# Patient Record
Sex: MALE | Race: WHITE | NOT HISPANIC OR LATINO | Employment: FULL TIME | URBAN - METROPOLITAN AREA
[De-identification: names, ages, dates, MRNs, and addresses within clinical notes are randomized per-mention and may not be internally consistent; named-entity substitution may affect disease eponyms.]

---

## 2017-02-16 ENCOUNTER — OFFICE VISIT (OUTPATIENT)
Dept: FAMILY MEDICINE | Facility: CLINIC | Age: 37
End: 2017-02-16
Payer: COMMERCIAL

## 2017-02-16 ENCOUNTER — DOCUMENTATION ONLY (OUTPATIENT)
Dept: FAMILY MEDICINE | Facility: CLINIC | Age: 37
End: 2017-02-16

## 2017-02-16 VITALS
OXYGEN SATURATION: 99 % | TEMPERATURE: 98 F | HEART RATE: 71 BPM | SYSTOLIC BLOOD PRESSURE: 121 MMHG | BODY MASS INDEX: 31.24 KG/M2 | DIASTOLIC BLOOD PRESSURE: 77 MMHG | HEIGHT: 73 IN | WEIGHT: 235.69 LBS | RESPIRATION RATE: 16 BRPM

## 2017-02-16 DIAGNOSIS — B35.4 TINEA CORPORIS: Primary | ICD-10-CM

## 2017-02-16 PROCEDURE — 99202 OFFICE O/P NEW SF 15 MIN: CPT | Mod: S$GLB,,, | Performed by: INTERNAL MEDICINE

## 2017-02-16 RX ORDER — FLUCONAZOLE 100 MG/1
100 TABLET ORAL DAILY
Qty: 7 TABLET | Refills: 4 | Status: SHIPPED | OUTPATIENT
Start: 2017-02-16 | End: 2017-03-18

## 2017-02-16 NOTE — PROGRESS NOTES
Health Maintenance Due   Topic Date Due    Lipid Panel  1980    TETANUS VACCINE  05/05/1999    Influenza Vaccine  08/01/2016

## 2017-02-16 NOTE — PATIENT INSTRUCTIONS
Lamisil Cream twice a day for one month of the rash recurs.    Cold Bond powder or Butt paste in your armpits, lower abdomen and groin will help prevent the infection.

## 2017-02-16 NOTE — MR AVS SNAPSHOT
[facility]  [id number] [address] [address]  Phone: [phone]  Fax: [phone]                  Amadou Mcneil   2017 8:40 AM   Office Visit    Description:  Male : 1980   Provider:  Leoncio Barone MD   Department:  [facility]           Reason for Visit     Rash           Diagnoses this Visit        Comments    Tinea corporis    -  Primary            To Do List           Goals (5 Years of Data)     None      Follow-Up and Disposition     Return in about 1 year (around 2018) for if you are not better return in one month.    Follow-up and Disposition History       These Medications        Disp Refills Start End    fluconazole (DIFLUCAN) 100 MG tablet 7 tablet 4 2017 3/18/2017    Take 1 tablet (100 mg total) by mouth once daily. - Oral    Pharmacy: [address] Ph #: [phone]         [facility] On Call     [facility] On Call Nurse Care Line -  Assistance  Registered nurses in the [facility] On Call Center provide clinical advisement, health education, appointment booking, and other advisory services.  Call for this free service at [phone].             Medications           Message regarding Medications     Verify the changes and/or additions to your medication regime listed below are the same as discussed with your clinician today.  If any of these changes or additions are incorrect, please notify your healthcare provider.        START taking these NEW medications        Refills    fluconazole (DIFLUCAN) 100 MG tablet 4    Sig: Take 1 tablet (100 mg total) by mouth once daily.    Class: Normal    Route: Oral           Verify that the below list of medications is an accurate representation of the medications you are currently taking.  If none reported, the list may be blank. If incorrect, please contact your healthcare provider. Carry this list with you in case of  "emergency.           Current Medications     fluconazole (DIFLUCAN) 100 MG tablet Take 1 tablet (100 mg total) by mouth once daily.           Clinical Reference Information           Your Vitals Were     BP Pulse Temp Resp Height Weight    121/77 (BP Location: Left arm, Patient Position: Sitting, BP Method: Automatic) 71 98 °F (36.7 °C) (Oral) 16 6' 1" (1.854 m) 106.9 kg (235 lb 10.8 oz)    SpO2 BMI             99% 31.09 kg/m2         Blood Pressure          Most Recent Value    BP  121/77      Allergies as of 2/16/2017     No Known Allergies      Immunizations Administered on Date of Encounter - 2/16/2017     None      Instructions    Lamisil Cream twice a day for one month of the rash recurs.    Cold Bond powder or Butt paste in your armpits, lower abdomen and groin will help prevent the infection.       Language Assistance Services     ATTENTION: Language assistance services are available, free of charge. Please call [phone].      ATENCIÓN: Si habla navid, tiene a winters disposición servicios gratuitos de asistencia lingüística. Llame al [phone].     JANET Ý: N?u b?n nói Ti?ng Vi?t, có các d?ch v? h? tr? ngôn ng? mi?n phí dành cho b?n. G?i s? [phone].         [facility] complies with applicable Federal civil rights laws and does not discriminate on the basis of race, color, national origin, age, disability, or sex.        "

## 2017-02-16 NOTE — PROGRESS NOTES
Subjective:       Patient ID: Amadou Mcneil is a 36 y.o. male.    Chief Complaint: Rash (sides and abdomen)    HPI       CHIEF COMPLAINT: Rash  HPI: Prescription steroids helped but until stopped in the comes roaring back, does not of the rationale    ONSET/TIMING: Onset      8 months     ago. Sudden: no.. Work related: no. Similar_problems_in_the_past: no.    DURATION:  Continuous..    QUALITY/COURSE:   Resolved.     LOCATION:     .  Armpits, inside of upper arms , lower abdomen    INTENSITY/SEVERITY:  Severity is #  0    (10 point scale).    CONTEXT/WHEN: .--Similar problems: no . .  Past treatments: none  . Exposure_to_others_with_similar_symptoms: no . . Exposure_to_poison _ivy: no. .   New exposures (soaps, lotions, laundry detergents, foods, medications, plants, insects or animals).    SYMPTOMS/RELATED: .--Possible medication side effect:    The following symptoms are positive if BOLD, negative otherwise.     REVIEW OF SYMPTOMS:  Itching.  Pain. Sharp_pain. Dull_pain. Burning_pain.  Erythema-Skin. Hypopigmentation.  hyperpigmentation . Inflammation. Herald_Patch.. fixed . evanescent.  Blisters. Purulence. Fever. Fatigue. Tick_Bites.               Review of Systems   Constitutional: Negative for fatigue, fever and unexpected weight change.   HENT: Negative for dental problem, hearing loss, nosebleeds, rhinorrhea, tinnitus, trouble swallowing and voice change.    Eyes: Negative for itching and visual disturbance.   Respiratory: Negative for cough, shortness of breath and wheezing.    Cardiovascular: Negative for chest pain and palpitations.   Gastrointestinal: Negative for abdominal pain, blood in stool, constipation, diarrhea, nausea and vomiting.   Endocrine: Negative for cold intolerance, heat intolerance, polydipsia and polyphagia.   Genitourinary: Negative for difficulty urinating and dysuria.   Musculoskeletal: Negative for arthralgias.   Allergic/Immunologic: Negative for environmental allergies  "and immunocompromised state.   Neurological: Negative for dizziness, seizures, weakness, numbness and headaches.   Hematological: Does not bruise/bleed easily.   Psychiatric/Behavioral: Negative for agitation, dysphoric mood, sleep disturbance and suicidal ideas. The patient is not nervous/anxious.        Objective:      Vitals:    02/16/17 0844   BP: 121/77   Pulse: 71   Resp: 16   Temp: 98 °F (36.7 °C)   TempSrc: Oral   SpO2: 99%   Weight: 106.9 kg (235 lb 10.8 oz)   Height: 6' 1" (1.854 m)   PainSc: 0-No pain     Physical Exam   Constitutional: He is oriented to person, place, and time. He appears well-developed and well-nourished.   HENT:   Head: Normocephalic and atraumatic.   Right Ear: External ear normal.   Left Ear: External ear normal.   Nose: Nose normal.   Mouth/Throat: Oropharynx is clear and moist.   Eyes: Conjunctivae and EOM are normal. Pupils are equal, round, and reactive to light. No scleral icterus.   Neck: Normal range of motion. Neck supple. No thyromegaly present.   Cardiovascular: Normal rate, regular rhythm, normal heart sounds and intact distal pulses.  Exam reveals no friction rub.    No murmur heard.  Pulmonary/Chest: Effort normal and breath sounds normal. No respiratory distress. He has no wheezes. He has no rales. He exhibits no tenderness.   Abdominal: Soft. Bowel sounds are normal. He exhibits no distension. There is no tenderness.   Musculoskeletal: Normal range of motion. He exhibits no edema or deformity.   Lymphadenopathy:     He has no cervical adenopathy.   Neurological: He is oriented to person, place, and time. He has normal reflexes. He displays normal reflexes. No cranial nerve deficit. He exhibits normal muscle tone. Coordination normal.   Skin: Skin is warm and dry. No rash noted.   Psychiatric: He has a normal mood and affect. His behavior is normal. Judgment and thought content normal.   Nursing note and vitals reviewed.        Assessment:       No diagnosis found.    "   Plan:     There are no diagnoses linked to this encounter.  No Follow-up on file.

## 2017-07-13 ENCOUNTER — DOCUMENTATION ONLY (OUTPATIENT)
Dept: FAMILY MEDICINE | Facility: CLINIC | Age: 37
End: 2017-07-13

## 2017-07-13 NOTE — PROGRESS NOTES
Health Maintenance Due   Topic Date Due    Lipid Panel  1981    TETANUS VACCINE  05/05/1999

## 2017-07-14 ENCOUNTER — DOCUMENTATION ONLY (OUTPATIENT)
Dept: FAMILY MEDICINE | Facility: CLINIC | Age: 37
End: 2017-07-14

## 2017-07-14 ENCOUNTER — TELEPHONE (OUTPATIENT)
Dept: FAMILY MEDICINE | Facility: CLINIC | Age: 37
End: 2017-07-14

## 2017-07-14 ENCOUNTER — OFFICE VISIT (OUTPATIENT)
Dept: FAMILY MEDICINE | Facility: CLINIC | Age: 37
End: 2017-07-14
Payer: COMMERCIAL

## 2017-07-14 VITALS
TEMPERATURE: 99 F | WEIGHT: 239.88 LBS | BODY MASS INDEX: 31.79 KG/M2 | DIASTOLIC BLOOD PRESSURE: 81 MMHG | HEART RATE: 78 BPM | SYSTOLIC BLOOD PRESSURE: 133 MMHG | HEIGHT: 73 IN

## 2017-07-14 VITALS
DIASTOLIC BLOOD PRESSURE: 81 MMHG | TEMPERATURE: 99 F | SYSTOLIC BLOOD PRESSURE: 133 MMHG | HEIGHT: 73 IN | OXYGEN SATURATION: 99 % | WEIGHT: 239.88 LBS | HEART RATE: 78 BPM | RESPIRATION RATE: 16 BRPM | BODY MASS INDEX: 31.79 KG/M2

## 2017-07-14 DIAGNOSIS — L03.116 CELLULITIS OF LEFT KNEE: Primary | ICD-10-CM

## 2017-07-14 DIAGNOSIS — R50.81 FEVER IN OTHER DISEASES: Primary | ICD-10-CM

## 2017-07-14 DIAGNOSIS — L02.91 ABSCESS: ICD-10-CM

## 2017-07-14 DIAGNOSIS — L03.116 CELLULITIS OF LEFT LOWER EXTREMITY: ICD-10-CM

## 2017-07-14 PROCEDURE — 99999 PR PBB SHADOW E&M-EST. PATIENT-LVL III: CPT | Mod: PBBFAC,,, | Performed by: FAMILY MEDICINE

## 2017-07-14 PROCEDURE — 99024 POSTOP FOLLOW-UP VISIT: CPT | Mod: S$GLB,,, | Performed by: FAMILY MEDICINE

## 2017-07-14 PROCEDURE — 10060 I&D ABSCESS SIMPLE/SINGLE: CPT | Mod: S$GLB,,, | Performed by: INTERNAL MEDICINE

## 2017-07-14 PROCEDURE — 96372 THER/PROPH/DIAG INJ SC/IM: CPT | Mod: S$GLB,,, | Performed by: FAMILY MEDICINE

## 2017-07-14 PROCEDURE — 99212 OFFICE O/P EST SF 10 MIN: CPT | Mod: 25,S$GLB,, | Performed by: INTERNAL MEDICINE

## 2017-07-14 RX ORDER — MUPIROCIN 20 MG/G
OINTMENT TOPICAL 3 TIMES DAILY
Qty: 15 G | Refills: 1 | Status: SHIPPED | OUTPATIENT
Start: 2017-07-14 | End: 2017-07-24

## 2017-07-14 RX ORDER — DOXYCYCLINE 100 MG/1
100 CAPSULE ORAL EVERY 12 HOURS
Qty: 20 CAPSULE | Refills: 0 | Status: SHIPPED | OUTPATIENT
Start: 2017-07-14 | End: 2017-09-14 | Stop reason: ALTCHOICE

## 2017-07-14 RX ORDER — CEFTRIAXONE 1 G/1
1 INJECTION, POWDER, FOR SOLUTION INTRAMUSCULAR; INTRAVENOUS
Status: COMPLETED | OUTPATIENT
Start: 2017-07-14 | End: 2017-07-14

## 2017-07-14 RX ADMIN — CEFTRIAXONE 1 G: 1 INJECTION, POWDER, FOR SOLUTION INTRAMUSCULAR; INTRAVENOUS at 02:07

## 2017-07-14 NOTE — TELEPHONE ENCOUNTER
Patient saw Dr Barone today at 8:40 then made an appt to see Dr Levin today at 10:40.  Patient states he wanted to make sure Dr Barone did everything right.  He also stated he wanted to get a steroid shot.  Informed patient his appt will be canceled with Dr Levin.  Patient hung up the phone.

## 2017-07-14 NOTE — PROCEDURES
"Incision & Drainage  Date/Time: 7/14/2017 9:20 AM  Performed by: MONTY JOHN  Authorized by: MONTY JOHN     Time out: Immediately prior to procedure a "time out" was called to verify the correct patient, procedure, equipment, support staff and site/side marked as required.    Consent Done?:  Yes (Written)    Type:  Abscess  Body area:  Lower extremity  Location details:  Left leg  Anesthesia:  Local infiltration  Local anesthetic:  Lidocaine 1% without epinephrinelidocaine 1% with epinephrine  Anesthetic total (ml):  3  Scalpel size:  11  Incision type:  Single straight  Complexity:  Simple  Drainage:  Serosanguinous  Drainage amount:  Moderate  Wound treatment:  Incision, drainage, expression of material, wound packed and drain placed  Packing material:  1/4 in gauze  Patient tolerance:  Patient tolerated the procedure well with no immediate complications      "

## 2017-07-14 NOTE — PROGRESS NOTES
2 patient identifiers used ( name &  ).  Administered Rocephin 1g vaccine IM RUOQG.  Patient tolerated well, no bleeding at insertion site noted.  Pain scale 0/10.  Aseptic technique maintained.  Advised patient to remain in clinic for 15 minutes to monitor for  Reaction.  No AR noted

## 2017-07-14 NOTE — PROGRESS NOTES
Pre-Visit Chart Review  For Appointment Scheduled on 07/14/2017    Health Maintenance Due   Topic Date Due    Lipid Panel  1980    TETANUS VACCINE  05/05/1999

## 2017-07-14 NOTE — PATIENT INSTRUCTIONS
Elevate foot (above heart level) frequently to resolve the swelling.    Twice a day - use a warm-hot compress then do wound care with water/betadine and soap.      On Monday either remove the packing if it looks a lot better or get a re-check at a clinic at [place] or an Urgent Care clinic.

## 2017-07-14 NOTE — PROGRESS NOTES
"Subjective:       Patient ID: Amadou Mcneil is a 37 y.o. male.    Chief Complaint: sores on knee and Fever    HPI         CHIEF COMPLAINT: Abscess  HPI:     ONSET/TIMING: Onset     1 week      ago. Sudden: no.. Work related: no. Similar_problems_in_the_past: no.    DURATION:  Continuous..    QUALITY/COURSE:   Improving for 1 day once drainage started.     LOCATION:    Left knee .     INTENSITY/SEVERITY:  Severity is #   6   (10 point scale).    CONTEXT/WHEN: .--Similar problems: no . .  Past treatments: none  . Exposure_to_others_with_similar_symptoms: no . . Exposure_to_poison _ivy: no. .   New exposures (soaps, lotions, laundry detergents, foods, medications, plants, insects or animals).    SYMPTOMS/RELATED: .--Possible medication side effect:    The following symptoms are positive if BOLD, negative otherwise.     REVIEW OF SYMPTOMS:  Itching.  Pain. Sharp_pain. Dull_pain. Burning_pain.  Erythema-Skin. Hypopigmentation.  hyperpigmentation . Inflammation. Herald_Patch.. fixed . evanescent.  Blisters. Purulence. Fever. Fatigue. Tick_Bites.               Review of Systems    Objective:      Vitals:    07/14/17 0831   BP: 133/81   Pulse: 78   Resp: 16   Temp: 98.6 °F (37 °C)   TempSrc: Oral   SpO2: 99%   Weight: 108.8 kg (239 lb 13.8 oz)   Height: 6' 1" (1.854 m)   PainSc:   5   PainLoc: Knee     Physical Exam   Constitutional: He appears well-developed and well-nourished.   Eyes: Pupils are equal, round, and reactive to light.   Cardiovascular: Normal rate, regular rhythm and normal heart sounds.    Pulmonary/Chest: Effort normal and breath sounds normal.   Abdominal: Soft. There is no tenderness.   Neurological: He is alert.   Skin:   6 cm abscess over the anterior lower knee.  The abscess at the top is at the patella.  It's draining in the center.  There is a cellulitis of 9 x 10 cm.  There is also a 2 cm abscess over the patella.  This is not draining.   Psychiatric: He has a normal mood and affect. His " behavior is normal. Thought content normal.   Nursing note and vitals reviewed.        Assessment:       1. Fever in other diseases    2. Abscess    3. Cellulitis of left lower extremity          Plan:     Fever in other diseases    Abscess  -     INCISION AND DRAINAGE    Cellulitis of left lower extremity  -     doxycycline (VIBRAMYCIN) 100 MG Cap; Take 1 capsule (100 mg total) by mouth every 12 (twelve) hours.  Dispense: 20 capsule; Refill: 0      No Follow-up on file.

## 2017-07-14 NOTE — PATIENT INSTRUCTIONS
Weight Management: Getting Started  Healthy bodies come in all shapes and sizes. Not all bodies are made to be thin. For some people, a healthy weight is higher than the average weight listed on weight charts. Your healthcare provider can help you decide on a healthy weight for you.    Reasons to lose weight  Losing weight can help with some health problems, such as high blood pressure, heart disease, diabetes, sleep apnea, and arthritis. You may also feel more energy.  Set your long-term goal  Your goal doesn't even have to be a specific weight. You may decide on a fitness goal (such as being able to walk 10 miles a week), or a health goal (such as lowering your blood pressure). Choose a goal that is measurable and reasonable, so you know when you've reached it. A goal of reaching a BMI of less than 25 is not always reasonable (or possible).   Make an action plan  Habits dont change overnight. Setting your goals too high can leave you feeling discouraged if you cant reach them. Be realistic. Choose one or two small changes you can make now. Set an action plan for how you are going to make these changes. When you can stick to this plan, keep making a few more small changes. Taking small steps will help you stay on the path to success.  Track your progress  Write down your goals. Then, keep a daily record of your progress. Write down what you eat and how active you are. This record lets you look back on how much youve done. It may also help when youre feeling frustrated. Reward yourself for success. Even if you dont reach every goal, give yourself credit for what you do get done.  Get support  Encouragement from others can help make losing weight easier. Ask your family members and friends for support. They may even want to join you. Also look to your healthcare provider, registered dietitian, and  for help. Your local hospital can give you more information about nutrition, exercise, and  weight loss.  Date Last Reviewed: 1/31/2016 © 2000-2016 [employer]. [address] [place] [address], [place] [address]. All rights reserved. This information is not intended as a substitute for professional medical care. Always follow your healthcare professional's instructions.        Walking for Fitness  Fitness walking has something for everyone, even people who are already fit. Walking is one of the safest ways to condition your body aerobically. It can boost energy, help you lose weight, and reduce stress.    Physical benefits  · Walking strengthens your heart and lungs, and tones your muscles.  · When walking, your feet land with less impact than in other sports. This reduces chances of muscle, bone, and joint injury.  · Regular walking improves your cholesterol levels and lowers your risk of heart disease. And it helps you control your blood sugar if you have diabetes.  · Walking is a weight-bearing activity, which helps maintain bone density. This can help prevent osteoporosis.  Personal rewards  · Taking walks can help you relax and manage stress. And fitness walking may make you feel better about yourself.  · Walking can help you sleep better at night and make you less likely to be depressed.  · Regular walking may help maintain your memory as you get older.  · Walking is a great way to spend extra time with friends and family members. Be sure to invite your dog along!  Q&A about fitness walking  Q: Will walking keep me fit?  A: Yes. Regular walking at the right pace gives you all the benefits of other aerobic activities, such as jogging and swimming.  Q: Will walking help me lose weight and keep it off?  A: Yes. Per mile, walking can burn as many calories as jogging. Your health care provider can help work walking into your weight-loss plan.  Q: Is walking safe for my health?  A: Yes. Walking is safe if you have high blood pressure, diabetes, heart disease, or other conditions. Talk to your health  care provider before you start.  Date Last Reviewed: 5/9/2015  © 9970-4900 [employer]. [address] [place] [address], [place] [address]. All rights reserved. This information is not intended as a substitute for professional medical care. Always follow your healthcare professional's instructions.

## 2017-07-14 NOTE — PROGRESS NOTES
"Subjective:       Patient ID: Amadou Mcneil is a 37 y.o. male.    Chief Complaint: Staph infection on knee    Pt and wife concerned if there is anything further that can be done treatment-wise for his infection as they are traveling tomorrow to [facility] where she is scheduled for surgery for a brain AVM next week.   He was seen and treated earlier today and had an I&D procedure done - pt concerned that he saw "only some bloody drainage".  I advised that occurs not uncommonly if there is not a eh abscess pocket involved but that the drainage sites and packing allow any pus to drain out.         Abscess   Chronicity:  NewProgression Since Onset: gradually worsening  Abscess location: left knee.  Associated Symptoms: a fever, chills  Characteristics: painful, redness and swelling      Review of Systems   Constitutional: Positive for chills and fever.   Respiratory: Negative for shortness of breath.    Cardiovascular: Negative for chest pain.   Gastrointestinal: Negative for abdominal pain and nausea.   Skin: Negative for rash.   Neurological: Negative for numbness.   All other systems reviewed and are negative.      Objective:      Physical Exam   Constitutional: He appears well-developed and well-nourished. No distress.   Musculoskeletal: He exhibits edema (1-2 plus to left lower leg below knee).   Skin: He is not diaphoretic.            Assessment:       1. Cellulitis of left knee        Plan:         Amadou was seen today for staph infection on knee.    Diagnoses and all orders for this visit:    Cellulitis of left knee  -     mupirocin (BACTROBAN) 2 % ointment; Apply topically 3 (three) times daily.    Other orders  -     cefTRIAXone injection 1 g; Inject 1 g into the muscle one time.      Patient Instructions   Elevate foot (above heart level) frequently to resolve the swelling.    Twice a day - use a warm-hot compress then do wound care with water/betadine and soap.      On Monday " either remove the packing if it looks a lot better or get a re-check at a clinic at [place] or an Urgent Care clinic.

## 2017-07-14 NOTE — PATIENT INSTRUCTIONS
Keep thick gauze over the draining areas.  Change them if they are saturated or least twice a day.  He careful to leave the gauze ribbons in the wound.  The best way to do this is to use Q-tips to hold them..    In 3 days you need to go to urgent care to have the gauze since in the wound pulled out and some Nu Gauze put in.  You do not have to be numbed up as this is a minor procedure just to keep the wound open.

## 2017-09-12 ENCOUNTER — DOCUMENTATION ONLY (OUTPATIENT)
Dept: FAMILY MEDICINE | Facility: CLINIC | Age: 37
End: 2017-09-12

## 2017-09-12 NOTE — PROGRESS NOTES
Pre-Visit Chart Review  For Appointment Scheduled on 9/14/2017     Health Maintenance Due   Topic Date Due    Lipid Panel  1980    TETANUS VACCINE  05/05/1999    Influenza Vaccine  08/01/2017

## 2017-09-14 ENCOUNTER — OFFICE VISIT (OUTPATIENT)
Dept: FAMILY MEDICINE | Facility: CLINIC | Age: 37
End: 2017-09-14
Payer: COMMERCIAL

## 2017-09-14 VITALS
DIASTOLIC BLOOD PRESSURE: 86 MMHG | TEMPERATURE: 99 F | WEIGHT: 244 LBS | SYSTOLIC BLOOD PRESSURE: 142 MMHG | HEART RATE: 67 BPM | HEIGHT: 73 IN | BODY MASS INDEX: 32.34 KG/M2

## 2017-09-14 DIAGNOSIS — R03.0 ELEVATED BLOOD PRESSURE READING IN OFFICE WITHOUT DIAGNOSIS OF HYPERTENSION: ICD-10-CM

## 2017-09-14 DIAGNOSIS — L02.419 ABSCESS OF LEG: Primary | ICD-10-CM

## 2017-09-14 PROCEDURE — 99213 OFFICE O/P EST LOW 20 MIN: CPT | Mod: S$GLB,,, | Performed by: NURSE PRACTITIONER

## 2017-09-14 PROCEDURE — 99999 PR PBB SHADOW E&M-EST. PATIENT-LVL III: CPT | Mod: PBBFAC,,, | Performed by: NURSE PRACTITIONER

## 2017-09-14 PROCEDURE — 3008F BODY MASS INDEX DOCD: CPT | Mod: S$GLB,,, | Performed by: NURSE PRACTITIONER

## 2017-09-14 RX ORDER — MUPIROCIN 20 MG/G
OINTMENT TOPICAL 2 TIMES DAILY
Qty: 44 G | Refills: 2 | Status: SHIPPED | OUTPATIENT
Start: 2017-09-14 | End: 2018-03-01 | Stop reason: SDUPTHER

## 2017-09-14 RX ORDER — SULFAMETHOXAZOLE AND TRIMETHOPRIM 800; 160 MG/1; MG/1
1 TABLET ORAL 2 TIMES DAILY
Qty: 40 TABLET | Refills: 0 | Status: SHIPPED | OUTPATIENT
Start: 2017-09-14 | End: 2017-10-04

## 2017-09-14 NOTE — PROGRESS NOTES
Subjective:       Patient ID: Amadou Mcneil is a 37 y.o. male.    Chief Complaint: Abscess    HPI   Chief Complaint  Chief Complaint   Patient presents with    Abscess       HPI  Amadou Mcneil is a 37 y.o. 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  Had some amoxicillin and started taking it and used some over the counter antibiotic ointment.  States had redness surrounding hard spot to thigh to large area of upper leg.  Has resolved mostly with just small area of redness remaining.  Has had abscess cultured in past that was positive for MRSA. Patient also has elevated blood pressure today without diagnosis of hypertension.  Has been eating high sodium diet. BMI 31.65, 5 pound weight gain since last visit in July 2017.  Established patient with last clinic appointment 7/14/2017.        PAST MEDICAL HISTORY:  History reviewed. No pertinent past medical history.    PAST SURGICAL HISTORY:  Past Surgical History:   Procedure Laterality Date    WISDOM TOOTH EXTRACTION         SOCIAL HISTORY:  Social History     Social History    Marital status:      Spouse name: N/A    Number of children: N/A    Years of education: N/A     Occupational History    [employer]      Social History Main Topics    Smoking status: Former Smoker    Smokeless tobacco: Never Used    Alcohol use Yes    Drug use: No    Sexual activity: Not on file     Other Topics Concern    Not on file     Social History Narrative    No narrative on file       FAMILY HISTORY:  Family History   Problem Relation Age of Onset    Cancer Father        ALLERGIES AND MEDICATIONS: updated and reviewed.  Review of patient's allergies indicates:  No Known Allergies  Current Outpatient Prescriptions   Medication Sig Dispense Refill    mupirocin (BACTROBAN) 2 % ointment Apply topically 2 (two) times  "daily. Affected area, nares, axilla, groin 44 g 2    sulfamethoxazole-trimethoprim 800-160mg (BACTRIM DS) 800-160 mg Tab Take 1 tablet by mouth 2 (two) times daily. 40 tablet 0     No current facility-administered medications for this visit.            Review of Systems   Constitutional: Negative for activity change, appetite change, fatigue, fever and unexpected weight change.   HENT: Negative for congestion, rhinorrhea and sinus pain.    Eyes: Negative for visual disturbance.   Respiratory: Negative for cough and shortness of breath.    Cardiovascular: Negative for chest pain, palpitations and leg swelling.   Gastrointestinal: Negative for abdominal pain, constipation, diarrhea, nausea and vomiting.   Genitourinary: Negative for difficulty urinating.   Neurological: Positive for numbness. Negative for dizziness and weakness.        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 The patient is not nervous/anxious.        Objective:       Vitals:    09/14/17 0915 09/14/17 0917   BP: (!) 135/92 (!) 142/86   BP Location: Right arm Left arm   Patient Position: Sitting Sitting   BP Method: Large (Automatic) Large (Manual)   Pulse: 67    Temp: 98.7 °F (37.1 °C)    TempSrc: Oral    Weight: 110.7 kg (244 lb)    Height: 6' 1" (1.854 m)      Physical Exam   Constitutional: He is oriented to person, place, and time. He appears well-developed. No distress.   Cardiovascular: Normal rate, regular rhythm, normal heart sounds and intact distal pulses.  Exam reveals no gallop and no friction rub.    No murmur heard.  Pulmonary/Chest: Effort normal and breath sounds normal. No respiratory distress. He has no wheezes. He has no rales.   Musculoskeletal: Normal range of motion.   Neurological: He is alert and oriented to person, place, and time.   Skin: Skin is warm and dry. Capillary refill takes less than 2 seconds. Rash noted. Rash is " pustular. He is not diaphoretic.        3 cm round firm abscess to left inner thigh, mildly warm, painful to palpation, no surrounding redness   Psychiatric: He has a normal mood and affect. His behavior is normal. Judgment and thought content normal.       Assessment:       1. Abscess of leg    2. Elevated blood pressure reading in office without diagnosis of hypertension    3. BMI 32.0-32.9,adult        Plan:   Amadou was seen today for abscess.    Diagnoses and all orders for this visit:    Abscess of leg        -  mupirocin (BACTROBAN) 2 % ointment; Apply topically 2 (two) times daily. Affected area, nares, axilla, groin  -     sulfamethoxazole-trimethoprim 800-160mg (BACTRIM DS) 800-160 mg Tab; Take 1 tablet by mouth 2 (two) times daily.  -   Educational handouts provided    Elevated blood pressure reading in office without diagnosis of hypertension  Comments:  Check blood pressure once per week, if over 140/over 90, will need meedication    BMI 32.0-32.9,adult  Discussed need for weight loss, portion controlled diet and aerobic exercise    Patient readiness: acceptance and barriers:none    During the course of the visit the patient was educated and counseled about the following:     Obesity:   General weight loss/lifestyle modification strategies discussed (elicit support from others; identify saboteurs; non-food rewards, etc).    Goals: Obesity: Reduce calorie intake and BMI    Did patient meet goals/outcomes: No    The following self management tools provided: declined    Patient Instructions (the written plan) was given to the patient/family.     Time spent with patient: 15 minutes

## 2017-09-14 NOTE — PATIENT INSTRUCTIONS
Methicillin-Resistant Staphylococcus aureus (MRSA)  What is MRSA?  Staphylococcus aureus bacteria or staph are common germs. They are normally found on the skin or in the nose of many people. Usually, the bacteria cause no problem. Occasionally, they can cause mild skin infections. Or severe infections of the skin, lungs, blood, or other organs or tissues may develop. Some staph infections can be easily treated with antibiotics. But one type of staph infection, methicillin-resistant staphylococcus aureus (MRSA) cannot. It is called methicillin-resistant because the antibiotic, methicillin, which used to be effective treatment, no longer works. MRSA is common in hospitals and nursing homes or long-term care facilities. It is also spreading among healthy children and adults outside the healthcare system. A person may be a carrier. Or he or she may have the infection.  · Colonization. When a person carries the MRSA bacteria but is healthy, it's called being colonized. This person can spread MRSA to others but has no infection.  · Infection. When a person gets sick because of the bacteria, it's called being infected with MRSA. This person can also spread MRSA to others. If not treated properly, MRSA infections can be very serious and even cause death.    What are the risk factors for MRSA?  Anyone can get MRSA, although there are factors that increase the risk. Some of these include:  · Recent or lengthy hospital stay  · Having a surgical wound or intravenous (IV) line  · Having a weakened immune system due to a medical condition or its treatment  · Living in a nursing home or long-term care facility  · Recent antibiotic therapy  · Diabetes  · Kidney dialysis  · HIV infection  · Injection drug use or sharing needles  · retirement or long-term time  · Living in any crowded facility, such as a dormitory  ·  service  · Sharing sports equipment, razors, or other sharp objects  How does MRSA spread?  · People who are  colonized with MRSA have MRSA in their noses or on their skin. Though they may not be sick themselves, they can spread the germs to others.  · In hospitals and long-term care facilities, MRSA can spread from patient to patient on the hands of healthcare workers. It can also spread on objects, such as cart or door handles and bedrails.  · Outside healthcare settings, MRSA usually spreads through skin-to-skin contact, shared towels or athletic equipment, or through close contact with an infected person.  What are the symptoms of MRSA infection?  MRSA skin infections start as small red bumps on the skin that look like pimples or spider bites. The small bumps usually get larger and become swollen, painful, warm to the touch, and filled with pus. Fever may be present. MRSA can also start in other ways. And it can spread deeper into the body where it can cause one or more of the following:  · Infections in bones, muscles, and other tissues  · Pneumonia, an infection in one or both lungs  · Infection of a surgical wound  · Infection in the bloodstream (bacteremia)  · Infection of the lining of the heart (endocarditis)  · Infection of the urinary tract (bladder and kidneys)  How is MRSA diagnosed?  A sample of blood, urine, or infected tissue may be taken to diagnose a MRSA infection. A swab of the inside of the nose is taken to diagnose colonization. The sample is then sent to a laboratory and tested for MRSA. if the infection involves bone, joint, or other organs, a blood test may be done. Imaging studies, such as an X-ray or CT scan, may also be needed.  How is MRSA treated?  MRSA infections are usually treated with antibiotics. It may be given by mouth in pill form or into a vein (intravenous or IV). If a skin abscess is present, it may be drained.   Patients who test positive for MRSA colonization may undergo a process called decolonization. A topical antibiotic is applied inside the nose or in the nostrils to kill the  bacteria. A special soap may be used to cleanse the skin.  Can MRSA be prevented?  Hospitals and nursing homes help prevent MRSA by doing the following:  · Handwashing. This is the single most important way to prevent the spread of germs. Healthcare workers should wash their hands with soap and water or an alcohol-based hand  before and after treating each patient. They also should clean their hands after touching any surface that may be contaminated.  · Protective clothing. Healthcare workers and visitors may wear gloves and a gown when entering the room of a patient with MRSA. They remove these items before leaving.  · Private rooms. Patients with MRSA infections are placed in private rooms or in a room with others who have the same infection.  · Personal care items. Patients with MRSA may have their own patient care items, such as thermometers and stethoscopes.  · Monitoring. Hospitals monitor the spread of MRSA and educate all staff on the best ways to prevent it.  Patients can help prevent MRSA by doing the following:  · Ask all hospital staff to wash their hands before touching you. Dont be afraid to speak up!  · Wash your own hands frequently with soap and water. Or use an alcohol-based hand gel.  · Ask that stethoscopes and other instruments be wiped with alcohol before they are used on you.  · Be sure youre tested for MRSA if you have a skin infection.  If you are taking care of someone with MRSA:  · Wash your hands well with soap and water before and after any contact with the person.  · Wear gloves when changing a bandage or touching an infected wound. Discard gloves after each use. Then wash your hands well.  · Wash the patient's bed linens, towels, and clothing in hot water with detergent or liquid bleach.  Everyone can help prevent MRSA by doing the following:  · Wash your hands often with warm water and soap.  ¨ Rub your hands together.  ¨ Clean the whole hand, under your nails, between your  fingers, and up the wrists.  ¨ Wash for at least 15 to 20 seconds.  ¨ Rinse, letting the water run down your fingers, not up your wrists.  ¨ Dry your hands well. Use a paper towel or turn off the faucet and open the door.  · If soap and water aren't available, use an alcohol-based hand .  ¨ Squeeze about a tablespoon of  into the palm of one hand.  ¨ Rub your hands together briskly, cleaning the backs of your hands, the palms, between your fingers, and up the wrists.  ¨ Rub until the  is gone and your hands are completely dry.  · Keep cuts and scrapes clean and covered until they heal.  · Avoid contact with the wounds or bandages of others.  · Avoid sharing towels, razors, clothing, and athletic equipment.  Date Last Reviewed: 10/1/2016  © 8745-9475 [employer]. [address] [place] [address]. All rights reserved. This information is not intended as a substitute for professional medical care. Always follow your healthcare professional's instructions.        Staph Infection (MRSA)  Staph is the short name for the common bacteria called staphylococcus aureus. Staph bacteria are often present on the skin without causing an infection. If it gets under the skin an infection occurs. This causes redness, tenderness, swelling and sometimes fluid drainage.  MRSA stands for methicillin-resistant staph aureus. Unlike a common staph infection, MRSA bacteria are resistant to the usual antibiotics and harder to treat. Also, MRSA is more toxic than common staph bacteria. It can spread quickly throughout the body and cause a life-threatening illness.  MRSA is spread to others by direct physical contact with the bacteria. MRSA can also be transmitted from items contaminated by a person who has the bacteria, such as bandages, towels, bed sheets, or sports equipment. It is generally not spread through the air.  But you can acquire it if you come in direct contact with the fluid from  someone's cough or sneeze.  Once you have a MRSA skin infection, you are at risk of having it again in the future.  If MRSA infection is suspected, the healthcare provider may take a wound culture to confirm the diagnosis. If an abscess is present, it may be drained. One or more antibiotics that work against MRSA will likely be prescribed.  Home care  · Take any antibiotics prescribed exactly as directed. Do not stop taking them until they are gone or your healthcare provider tells you to stop, even if you feel better.  · If antibiotic ointment was prescribed, use it as directed.  · Wash your whole body (scalp to toes) daily for 5 days with prescription soap. Scrub fingernails with a brush for 1 minute twice a day with prescription soap.  · Keep wounds covered with clean, dry bandages. Change dressings as they become soiled. Wash your hands well each time you change the bandage or touch the wound.  · Remove any artificial nails and nail polish.  Treating household members  If you have been diagnosed with possible MRSA infection, those living with you are at higher risk of carrying the bacteria on their skin or in their nose, even if there is no sign of infection. Bacteria must be removed from the skin of all household members at the same time so it is not passed back and forth. Advise them to remove the bacteria as follows:  · Household member should wash with prescription soap as outlined above.  · If anyone in the household has a skin infection, it must be treated by a healthcare provider. Washing alone will not treat a MRSA infection.  · Clean counter tops and children's toys.  · Do not share personal items such as toothbrush and razors. It is okay to share glasses, plates, and utensils.  Preventing spread of infection  · Wash your hands frequently with plain soap and warm water. Be certain to clean under the fingernails, between the fingers, and the wrists. Dry hands with a single use towel (for example a paper  towel). If soap and water are not available, you can use an alcohol-based hand . Rub the  over the entire surface of the hands, fingers, and wrists until dry.  · Avoid sharing personal items such as towels, washcloths, razors, clothing, or uniforms. Wash soiled sheets, towels or clothes in hot water with laundry detergent. Use an automatic clothes dryer set on high to kill any remaining bacteria.  · If you use a gym, wipe down equipment with an alcohol-based  before and after each use.  Wipe the handgrips as well.  · If you participate in sports, shower with plain soap after every activity. Use a clean towel for each shower.  Follow-up care  Follow-up with your healthcare provider or as advised by our staff. If a wound culture was taken, call as directed for the results. You will be told about any changes to your treatment.  If you are diagnosed with MRSA, tell medical personnel in the future that you have been treated for this type of infection.  When to seek medical advice  Call your healthcare provider if any of the following occur:  · Increasing redness, swelling or pain  · Red streaks in the skin around the wound  · Weakness or dizziness  · New appearance of pus or drainage from the wound  · New fever over 100.4º F (38.0º C), or as directed by the healthcare provider  Date Last Reviewed: 9/25/2015  © 2462-0861 [employer]. [address] [place] [address]. All rights reserved. This information is not intended as a substitute for professional medical care. Always follow your healthcare professional's instructions.

## 2018-02-26 ENCOUNTER — TELEPHONE (OUTPATIENT)
Dept: FAMILY MEDICINE | Facility: CLINIC | Age: 38
End: 2018-02-26

## 2018-02-26 NOTE — TELEPHONE ENCOUNTER
Returned patient wife call, she is requesting to give patient advise about not smoking.       ----- Message from Molly Ga sent at 2/26/2018 12:52 PM CST -----  Contact: [email or web address]/[phone]  Patients wife states that she need to speak to you about some issues with the patient.  Please call Spencer at [phone].

## 2018-03-01 ENCOUNTER — OFFICE VISIT (OUTPATIENT)
Dept: FAMILY MEDICINE | Facility: CLINIC | Age: 38
End: 2018-03-01
Payer: COMMERCIAL

## 2018-03-01 VITALS
WEIGHT: 235 LBS | DIASTOLIC BLOOD PRESSURE: 82 MMHG | BODY MASS INDEX: 31.14 KG/M2 | HEIGHT: 73 IN | SYSTOLIC BLOOD PRESSURE: 126 MMHG | TEMPERATURE: 99 F | HEART RATE: 74 BPM

## 2018-03-01 DIAGNOSIS — A49.02 MRSA (METHICILLIN RESISTANT STAPHYLOCOCCUS AUREUS): ICD-10-CM

## 2018-03-01 DIAGNOSIS — Z72.0 TOBACCO USE: ICD-10-CM

## 2018-03-01 DIAGNOSIS — L02.214 GROIN ABSCESS: Primary | ICD-10-CM

## 2018-03-01 DIAGNOSIS — L03.314 CELLULITIS OF GROIN: ICD-10-CM

## 2018-03-01 PROCEDURE — 99213 OFFICE O/P EST LOW 20 MIN: CPT | Mod: S$GLB,,, | Performed by: NURSE PRACTITIONER

## 2018-03-01 PROCEDURE — 99999 PR PBB SHADOW E&M-EST. PATIENT-LVL III: CPT | Mod: PBBFAC,,, | Performed by: NURSE PRACTITIONER

## 2018-03-01 RX ORDER — SULFAMETHOXAZOLE AND TRIMETHOPRIM 800; 160 MG/1; MG/1
1 TABLET ORAL 2 TIMES DAILY
Qty: 40 TABLET | Refills: 0 | Status: SHIPPED | OUTPATIENT
Start: 2018-03-01 | End: 2018-04-26 | Stop reason: ALTCHOICE

## 2018-03-01 RX ORDER — MUPIROCIN 20 MG/G
OINTMENT TOPICAL 2 TIMES DAILY
Qty: 44 G | Refills: 2 | Status: SHIPPED | OUTPATIENT
Start: 2018-03-01 | End: 2018-04-26 | Stop reason: ALTCHOICE

## 2018-03-01 NOTE — PROGRESS NOTES
Subjective:       Patient ID: Amadou Mcneil is a 37 y.o. male.    Chief Complaint: Abscess    HPI   Chief Complaint  Chief Complaint   Patient presents with    Abscess       HPI  Amadou Mcneil is a 37 y.o. male with medical diagnoses as listed in the medical history and problem list that presents with c/o abscess to right groin that he noted about a week ago and states was painful and firm and then ruptured yesterday and drained purulent sanguinous drainage.  Denies fever and chills.  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  BMI today 31.00 and patient has lost 9 pounds since last visit.  Established patient with last clinic appointment 9/14/2017.        PAST MEDICAL HISTORY:  History reviewed. No pertinent past medical history.    PAST SURGICAL HISTORY:  Past Surgical History:   Procedure Laterality Date    WISDOM TOOTH EXTRACTION         SOCIAL HISTORY:  Social History     Social History    Marital status:      Spouse name: N/A    Number of children: N/A    Years of education: N/A     Occupational History    offshore worker      Social History Main Topics    Smoking status: Former Smoker    Smokeless tobacco: Current User     Types: Chew    Alcohol use Yes    Drug use: No    Sexual activity: Yes     Partners: Female     Other Topics Concern    Not on file     Social History Narrative    No narrative on file       FAMILY HISTORY:  Family History   Problem Relation Age of Onset    Cancer Father        ALLERGIES AND MEDICATIONS: updated and reviewed.  Review of patient's allergies indicates:  No Known Allergies  Current Outpatient Prescriptions   Medication Sig Dispense Refill    mupirocin (BACTROBAN) 2 % ointment Apply topically 2 (two) times daily. Affected area, nares, axilla, groin 44 g 2    sulfamethoxazole-trimethoprim 800-160mg (BACTRIM DS) 800-160 mg Tab Take 1 tablet by mouth 2 (two) times  "daily. 40 tablet 0     No current facility-administered medications for this visit.      Review of Systems   Constitutional: Negative for chills and fever.   HENT: Negative for congestion, nosebleeds, postnasal drip, rhinorrhea, sinus pain, sinus pressure and sore throat.    Respiratory: Negative for cough and shortness of breath.    Cardiovascular: Negative for chest pain and palpitations.   Gastrointestinal: Negative for abdominal pain, constipation, diarrhea, nausea and vomiting.   Genitourinary: Negative for difficulty urinating.   Musculoskeletal: Negative for arthralgias and myalgias.   Skin: Positive for wound.        Right groin abscess       Objective:       Vitals:    03/01/18 1000   BP: 126/82   BP Location: Right arm   Patient Position: Sitting   BP Method: X-Large (Automatic)   Pulse: 74   Temp: 98.5 °F (36.9 °C)   TempSrc: Oral   Weight: 106.6 kg (235 lb)   Height: 6' 1" (1.854 m)     Physical Exam   Constitutional: Vital signs are normal. He appears well-developed and well-nourished. No distress.   HENT:   Head: Normocephalic and atraumatic.   Neck: Carotid bruit is not present.   Cardiovascular: Normal rate, regular rhythm, S1 normal, S2 normal, normal heart sounds, intact distal pulses and normal pulses.    No murmur heard.  Pulmonary/Chest: Effort normal. No respiratory distress. He has no decreased breath sounds. He has no wheezes. He has no rhonchi. He has no rales.   Skin: He is not diaphoretic.        Psychiatric: He has a normal mood and affect. His speech is normal and behavior is normal. Judgment and thought content normal. Cognition and memory are normal.   Nursing note and vitals reviewed.      Assessment:       1. Groin abscess    2. Cellulitis of groin    3. MRSA (methicillin resistant Staphylococcus aureus)    4. BMI 31.0-31.9,adult    5. Tobacco use        Plan:   Amadou was seen today for abscess.  Patient gets recurrent abscesses, cultured positive for MRSA in past.  Diagnoses and all " orders for this visit:    Groin abscess  -     mupirocin (BACTROBAN) 2 % ointment; Apply topically 2 (two) times daily. Affected area, nares, axilla, groin  -     sulfamethoxazole-trimethoprim 800-160mg (BACTRIM DS) 800-160 mg Tab; Take 1 tablet by mouth 2 (two) times daily.  - Resolving, draining  - Hot soaks TID and encourage drainage    Cellulitis of groin  -     mupirocin (BACTROBAN) 2 % ointment; Apply topically 2 (two) times daily. Affected area, nares, axilla, groin  -     sulfamethoxazole-trimethoprim 800-160mg (BACTRIM DS) 800-160 mg Tab; Take 1 tablet by mouth 2 (two) times daily.    MRSA (methicillin resistant Staphylococcus aureus)  -     mupirocin (BACTROBAN) 2 % ointment; Apply topically 2 (two) times daily. Affected area, nares, axilla, groin  -     sulfamethoxazole-trimethoprim 800-160mg (BACTRIM DS) 800-160 mg Tab; Take 1 tablet by mouth 2 (two) times daily.  - Educational handouts provided. MRSA        - Bactroban ointment to bilateral axilla, nares, and groin 2 times daily for 10 days.   Soak in 1 cup bleach to full tub water.   Bathe with hibiclens.    BMI 31.0-31.9,adult   Weight loss recommended with well balanced diet and portion controlled meals and increased activity.   Tobacco use   Patient chews tobacco and is encouraged to quit    Follow-up if symptoms worsen or fail to improve.     Patient readiness: acceptance and barriers:none    During the course of the visit the patient was educated and counseled about the following:     Obesity:   General weight loss/lifestyle modification strategies discussed (elicit support from others; identify saboteurs; non-food rewards, etc).  Informal exercise measures discussed, e.g. taking stairs instead of elevator.  Regular aerobic exercise program discussed.    Goals: Obesity: Reduce calorie intake and BMI    Did patient meet goals/outcomes: Yes, patient has lost 9 pounds since last visit    The following self management tools provided:  declined    Patient Instructions (the written plan) was given to the patient/family.     Time spent with patient: 30 minutes    Barriers to medications present (no )    Adverse reactions to current medications (no)    Over the counter medications reviewed (Yes)

## 2018-03-01 NOTE — PATIENT INSTRUCTIONS
Advised to quit chewing tobacco.   Staph Infection (MRSA)  Staph is the short name for the common bacteria called staphylococcus aureus. Staph bacteria are often present on the skin without causing an infection. If it gets under the skin an infection occurs. This causes redness, tenderness, swelling and sometimes fluid drainage.  MRSA stands for methicillin-resistant staph aureus. Unlike a common staph infection, MRSA bacteria are resistant to the usual antibiotics and harder to treat. Also, MRSA is more toxic than common staph bacteria. It can spread quickly throughout the body and cause a life-threatening illness.  MRSA is spread to others by direct physical contact with the bacteria. MRSA can also be transmitted from items contaminated by a person who has the bacteria, such as bandages, towels, bed sheets, or sports equipment. It is generally not spread through the air.  But you can acquire it if you come in direct contact with the fluid from someone's cough or sneeze.  Once you have a MRSA skin infection, you are at risk of having it again in the future.  If MRSA infection is suspected, the healthcare provider may take a wound culture to confirm the diagnosis. If an abscess is present, it may be drained. One or more antibiotics that work against MRSA will likely be prescribed.  Home care  · Take any antibiotics prescribed exactly as directed. Do not stop taking them until they are gone or your healthcare provider tells you to stop, even if you feel better.  · If antibiotic ointment was prescribed, use it as directed.  · Wash your whole body (scalp to toes) daily for 5 days with prescription soap. Scrub fingernails with a brush for 1 minute twice a day with prescription soap.  · Keep wounds covered with clean, dry bandages. Change dressings as they become soiled. Wash your hands well each time you change the bandage or touch the wound.  · Remove any artificial nails and nail polish.  Treating household  members  If you have been diagnosed with possible MRSA infection, those living with you are at higher risk of carrying the bacteria on their skin or in their nose, even if there is no sign of infection. Bacteria must be removed from the skin of all household members at the same time so it is not passed back and forth. Advise them to remove the bacteria as follows:  · Household member should wash with prescription soap as outlined above.  · If anyone in the household has a skin infection, it must be treated by a healthcare provider. Washing alone will not treat a MRSA infection.  · Clean counter tops and children's toys.  · Do not share personal items such as toothbrush and razors. It is okay to share glasses, plates, and utensils.  Preventing spread of infection  · Wash your hands frequently with plain soap and warm water. Be certain to clean under the fingernails, between the fingers, and the wrists. Dry hands with a single use towel (for example a paper towel). If soap and water are not available, you can use an alcohol-based hand . Rub the  over the entire surface of the hands, fingers, and wrists until dry.  · Avoid sharing personal items such as towels, washcloths, razors, clothing, or uniforms. Wash soiled sheets, towels or clothes in hot water with laundry detergent. Use an automatic clothes dryer set on high to kill any remaining bacteria.  · If you use a gym, wipe down equipment with an alcohol-based  before and after each use.  Wipe the handgrips as well.  · If you participate in sports, shower with plain soap after every activity. Use a clean towel for each shower.  Follow-up care  Follow-up with your healthcare provider or as advised by our staff. If a wound culture was taken, call as directed for the results. You will be told about any changes to your treatment.  If you are diagnosed with MRSA, tell medical personnel in the future that you have been treated for this type of  infection.  When to seek medical advice  Call your healthcare provider if any of the following occur:  · Increasing redness, swelling or pain  · Red streaks in the skin around the wound  · Weakness or dizziness  · New appearance of pus or drainage from the wound  · New fever over 100.4º F (38.0º C), or as directed by the healthcare provider  Date Last Reviewed: 9/25/2015  © 7599-4660 [employer]. [address] [place] [address], [place] [address]. All rights reserved. This information is not intended as a substitute for professional medical care. Always follow your healthcare professional's instructions.    Bactroban ointment to bilateral axilla, nares, and groin 2 times daily for 10 days.  Soak in 1 cup bleach to full tub water.  Bathe with hibiclens.

## 2018-04-26 ENCOUNTER — OFFICE VISIT (OUTPATIENT)
Dept: FAMILY MEDICINE | Facility: CLINIC | Age: 38
End: 2018-04-26
Payer: COMMERCIAL

## 2018-04-26 VITALS
SYSTOLIC BLOOD PRESSURE: 135 MMHG | DIASTOLIC BLOOD PRESSURE: 81 MMHG | WEIGHT: 235 LBS | HEIGHT: 73 IN | BODY MASS INDEX: 31.14 KG/M2 | HEART RATE: 72 BPM | TEMPERATURE: 98 F

## 2018-04-26 DIAGNOSIS — S91.312A LACERATION OF LEFT FOOT, INITIAL ENCOUNTER: Primary | ICD-10-CM

## 2018-04-26 PROCEDURE — 99213 OFFICE O/P EST LOW 20 MIN: CPT | Mod: S$GLB,,, | Performed by: NURSE PRACTITIONER

## 2018-04-26 PROCEDURE — 99999 PR PBB SHADOW E&M-EST. PATIENT-LVL III: CPT | Mod: PBBFAC,,, | Performed by: NURSE PRACTITIONER

## 2018-04-26 NOTE — PROGRESS NOTES
Subjective:       Patient ID: Amadou Mcneil is a 37 y.o. male.    Chief Complaint: Laceration    HPI   Chief Complaint  Chief Complaint   Patient presents with    Laceration       HPI  Amadou Mcneil is a 37 y.o. male with medical diagnoses as listed in the medical history and problem list that presents with a laceration to the left foot that he states he has had for about 5 months.  Patient works off shore. Patient has a history of multiple abscesses and tried bleach baths for 10 days and has not had any recent abscesses or boils. BMI today is 31.00 and weight is stable at 235 pounds.   Established patient with last clinic appointment 3/1/2018.        PAST MEDICAL HISTORY:  History reviewed. No pertinent past medical history.    PAST SURGICAL HISTORY:  Past Surgical History:   Procedure Laterality Date    WISDOM TOOTH EXTRACTION         SOCIAL HISTORY:  Social History     Social History    Marital status:      Spouse name: N/A    Number of children: N/A    Years of education: N/A     Occupational History    offshore worker      Social History Main Topics    Smoking status: Former Smoker    Smokeless tobacco: Current User     Types: Chew    Alcohol use Yes    Drug use: No    Sexual activity: Yes     Partners: Female     Other Topics Concern    Not on file     Social History Narrative    No narrative on file       FAMILY HISTORY:  Family History   Problem Relation Age of Onset    Cancer Father        ALLERGIES AND MEDICATIONS: updated and reviewed.  Review of patient's allergies indicates:  No Known Allergies  No current outpatient prescriptions on file.     No current facility-administered medications for this visit.      Review of Systems   Constitutional: Negative for appetite change, chills and fever.   Respiratory: Negative for cough and shortness of breath.    Cardiovascular: Negative for chest pain and palpitations.   Gastrointestinal: Negative for abdominal pain,  "constipation, diarrhea, nausea and vomiting.   Genitourinary: Negative for difficulty urinating.   Musculoskeletal: Negative for arthralgias and myalgias.   Skin: Positive for wound.        Has a crack to sole of left foot, lateral aspect just proximal to ball of foot that has been present for 5 months, once in a while gets sore.  No bleeding or drainage, redness or swelling.   Neurological: Negative for dizziness, numbness and headaches.       Objective:       Vitals:    04/26/18 0707   BP: 135/81   BP Location: Right arm   Patient Position: Sitting   BP Method: Large (Automatic)   Pulse: 72   Temp: 98.2 °F (36.8 °C)   TempSrc: Oral   Weight: 106.6 kg (235 lb)   Height: 6' 1" (1.854 m)     Physical Exam   Constitutional: He is oriented to person, place, and time. Vital signs are normal. He appears well-developed and well-nourished. No distress.   HENT:   Head: Normocephalic and atraumatic.   Eyes: Conjunctivae and lids are normal. Right conjunctiva is not injected. Left conjunctiva is not injected.   Neck: Carotid bruit is not present.   Cardiovascular: Normal rate, regular rhythm, S1 normal, S2 normal and normal heart sounds.    No murmur heard.  Pulmonary/Chest: Effort normal and breath sounds normal. No respiratory distress. He has no decreased breath sounds. He has no wheezes. He has no rhonchi. He has no rales.   Musculoskeletal: Normal range of motion.   Neurological: He is alert and oriented to person, place, and time. He has normal strength.   Skin: Skin is warm and dry. Capillary refill takes less than 2 seconds. Laceration noted. He is not diaphoretic. No pallor.   2 cm superficial laceration/crack to skin of sole of left foot lateral aspect just proximal to ball of foot.  No pain with palpation, no redness, edema, or warmth noted. An edge of dry hard skin present that creates a flap covering actual open area.    Psychiatric: He has a normal mood and affect. His speech is normal and behavior is normal. " Judgment and thought content normal. Cognition and memory are normal.   Nursing note and vitals reviewed.      Assessment:       1. Laceration of left foot, initial encounter    2. BMI 31.0-31.9,adult        Plan:   Amadou was seen today for laceration.    Diagnoses and all orders for this visit:    Laceration of left foot, initial encounter   Instructed to trim dead dry skin from edge of laceration and apply liquid band aid to area until healed. Discussed use of steri strips and referral to podiatry which patient declines at this time.   Laceration is not infected at this time.    Reports present x 5 months, appears to be more of a crack in skin due to dry skin as opposed to actual laceration, patient recalls no injury.    If any signs or symptoms of infection call or return to office.     BMI 31.0-31.9,adult   Obesity discussed.   Weight loss recommended with well balanced diet and portion controlled meals and increased activity.     Follow-up if symptoms worsen or fail to improve.     Patient readiness: acceptance and barriers:readiness    During the course of the visit the patient was educated and counseled about the following:     Obesity:   General weight loss/lifestyle modification strategies discussed (elicit support from others; identify saboteurs; non-food rewards, etc).  Informal exercise measures discussed, e.g. taking stairs instead of elevator.  Regular aerobic exercise program discussed.    Goals: Obesity: Reduce calorie intake and BMI    Did patient meet goals/outcomes: No    The following self management tools provided: declined    Patient Instructions (the written plan) was given to the patient/family.     Time spent with patient: 15 minutes    Barriers to medications present (no )    Adverse reactions to current medications (no)    Over the counter medications reviewed (Yes)

## 2018-04-26 NOTE — PATIENT INSTRUCTIONS
Instructed to trim dead dry skin from edge of laceration and apply liquid band aid to area until healed.   If any signs or symptoms of infection call or return to office.

## 2019-03-18 ENCOUNTER — HOSPITAL ENCOUNTER (OUTPATIENT)
Dept: RADIOLOGY | Facility: HOSPITAL | Age: 39
Discharge: HOME | End: 2019-03-18
Attending: NURSE PRACTITIONER
Payer: COMMERCIAL

## 2019-03-18 ENCOUNTER — OFFICE VISIT (OUTPATIENT)
Dept: FAMILY MEDICINE | Facility: CLINIC | Age: 39
End: 2019-03-18
Payer: COMMERCIAL

## 2019-03-18 VITALS
DIASTOLIC BLOOD PRESSURE: 84 MMHG | SYSTOLIC BLOOD PRESSURE: 130 MMHG | BODY MASS INDEX: 32.07 KG/M2 | TEMPERATURE: 98 F | WEIGHT: 242 LBS | HEART RATE: 89 BPM | HEIGHT: 73 IN

## 2019-03-18 DIAGNOSIS — L03.115 CELLULITIS OF RIGHT KNEE: ICD-10-CM

## 2019-03-18 DIAGNOSIS — S80.12XA LEG HEMATOMA, LEFT, INITIAL ENCOUNTER: ICD-10-CM
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
## 2019-03-18 DIAGNOSIS — K13.0 CHAPPED LIPS: ICD-10-CM

## 2019-03-18 DIAGNOSIS — Z23 NEED FOR HEPATITIS A AND B VACCINATION: ICD-10-CM

## 2019-03-18 DIAGNOSIS — L02.415 ABSCESS OF RIGHT KNEE: Primary | ICD-10-CM

## 2019-03-18 DIAGNOSIS — R59.0 INGUINAL LYMPHADENOPATHY: ICD-10-CM

## 2019-03-18 PROCEDURE — 90471 HEPATITIS A HEPATITIS B COMBINED VACCINE IM: ICD-10-PCS | Mod: S$GLB,,, | Performed by: NURSE PRACTITIONER

## 2019-03-18 PROCEDURE — 99999 PR PBB SHADOW E&M-EST. PATIENT-LVL III: ICD-10-PCS | Mod: PBBFAC,,, | Performed by: NURSE PRACTITIONER

## 2019-03-18 PROCEDURE — 76882 US LMTD JT/FCL EVL NVASC XTR: CPT | Mod: 26,LT,, | Performed by: RADIOLOGY

## 2019-03-18 PROCEDURE — 3008F PR BODY MASS INDEX (BMI) DOCUMENTED: ICD-10-PCS | Mod: CPTII,S$GLB,, | Performed by: NURSE PRACTITIONER

## 2019-03-18 PROCEDURE — 90471 IMMUNIZATION ADMIN: CPT | Mod: S$GLB,,, | Performed by: NURSE PRACTITIONER

## 2019-03-18 PROCEDURE — 3008F BODY MASS INDEX DOCD: CPT | Mod: CPTII,S$GLB,, | Performed by: NURSE PRACTITIONER

## 2019-03-18 PROCEDURE — 99214 PR OFFICE/OUTPT VISIT, EST, LEVL IV, 30-39 MIN: ICD-10-PCS | Mod: 25,S$GLB,, | Performed by: NURSE PRACTITIONER

## 2019-03-18 PROCEDURE — 90636 HEPATITIS A HEPATITIS B COMBINED VACCINE IM: ICD-10-PCS | Mod: S$GLB,,, | Performed by: NURSE PRACTITIONER

## 2019-03-18 PROCEDURE — 76882 US LMTD JT/FCL EVL NVASC XTR: CPT | Mod: TC,LT

## 2019-03-18 PROCEDURE — 90636 HEP A/HEP B VACC ADULT IM: CPT | Mod: S$GLB,,, | Performed by: NURSE PRACTITIONER

## 2019-03-18 PROCEDURE — 99214 OFFICE O/P EST MOD 30 MIN: CPT | Mod: 25,S$GLB,, | Performed by: NURSE PRACTITIONER

## 2019-03-18 PROCEDURE — 99999 PR PBB SHADOW E&M-EST. PATIENT-LVL III: CPT | Mod: PBBFAC,,, | Performed by: NURSE PRACTITIONER

## 2019-03-18 PROCEDURE — 76882 US EXTREMITY NON VASCULAR LIMITED LEFT: ICD-10-PCS | Mod: 26,LT,, | Performed by: RADIOLOGY

## 2019-03-18 RX ORDER — SULFAMETHOXAZOLE AND TRIMETHOPRIM 800; 160 MG/1; MG/1
1 TABLET ORAL 2 TIMES DAILY
Qty: 20 TABLET | Refills: 0 | Status: SHIPPED | OUTPATIENT
Start: 2019-03-18 | End: 2019-11-29

## 2019-03-18 RX ORDER — MUPIROCIN 20 MG/G
OINTMENT TOPICAL 3 TIMES DAILY
Qty: 22 G | Refills: 2 | Status: SHIPPED | OUTPATIENT
Start: 2019-03-18 | End: 2021-08-27 | Stop reason: ALTCHOICE

## 2019-03-18 NOTE — PATIENT INSTRUCTIONS
Discharge Instructions for Cellulitis  You have been diagnosed with cellulitis. This is an infection in the deepest layer of the skin. In some cases, the infection also affects the muscle. Cellulitis is caused by bacteria. The bacteria can enter the body through broken skin. This can happen with a cut, scratch, animal bite, or an insect bite that has been scratched. You may have been treated in the hospital with antibiotics and fluids. You will likely be given a prescription for antibiotics to take at home. This sheet will help you take care of yourself at home.  Home care  When you are home:  · Take the prescribed antibiotic medicine you are given as directed until it is gone. Take it even if you feel better. It treats the infection and stops it from returning. Not taking all the medicine can make future infections hard to treat.  · Keep the infected area clean.  · When possible, raise the infected area above the level of your heart. This helps keep swelling down.  · Talk with your healthcare provider if you are in pain. Ask what kind of over-the-counter medicine you can take for pain.  · Apply clean bandages as advised.  · Take your temperature once a day for a week.  · Wash your hands often to prevent spreading the infection.  In the future, wash your hands before and after you touch cuts, scratches, or bandages. This will help prevent infection.   When to call your healthcare provider  Call your healthcare provider immediately if you have any of the following:  · Difficulty or pain when moving the joints above or below the infected area  · Discharge or pus draining from the area  · Fever of 100.4°F (38°C) or higher, or as directed by your healthcare provider  · Pain that gets worse in or around the infected   · Redness that gets worse in or around the infected area, particularly if the area of redness expands to a wider area  · Shaking chills  · Swelling of the infected area  · Vomiting   Date Last Reviewed:  8/1/2016 © 2000-2017 [employer]. [address] [place] [address], [place] [address]. All rights reserved. This information is not intended as a substitute for professional medical care. Always follow your healthcare professional's instructions.        Hematoma  A hematoma is a collection of blood trapped outside of a blood vessel. It is what we think of as a bruise or a contusion. It is usually seen under the skin as a black and blue spot on your arm or leg, or a bump on your head after an injury. It can be almost anywhere on or in your body. It can also occur in an internal organ where it can be more serious.  A hematoma is caused by an injury with damage to small blood vessels. This causes blood to leak into the tissues. Blood forms a pocket under the skin that swells and looks like a purplish patch.  Gradually the blood in the hematoma is absorbed back into the body. The swelling and pain of the hematoma will go away. This takes from 1 to 4 weeks, depending on the size of the hematoma. The skin over the hematoma may turn bluish then brown and yellow as the blood is dissolved and absorbed. Usually, this only takes a couple of weeks but can last months.  Home care  · Limit motion of the joints near the hematoma. If the hematoma is large and painful, avoid sports and other vigorous physical activity until the swelling and pain goes away.  · Apply an ice pack (ice cubes in a plastic bag, wrapped in a thin towel or a frozen bag of peas) over the injured area for 20 minutes every 1 to 2 hours the first day. Continue with ice packs 3 to 4 times a day for the next 2 days. Continue the use of ice packs for relief of pain and swelling as needed.  · If you need anything for pain, you can take acetaminophen, unless you were given a different pain medicine to use. Talk with your doctor before using this medicine if you have chronic liver or kidney disease. Also talk with your doctor if you have had a stomach ulcer  or digestive tract bleeding, or are taking blood-thinner medicines.  Follow-up care  Follow up with your healthcare provider, or as advised. If X-rays or a CT scan were done, you will be notified if there is a change in the reading, especially if it affects treatment.  When to seek medical advice  Call your healthcare provider right away f any of the following occur:  · Redness around the hematoma  · Increase in pain or warmth in the hematoma  · Increase in size of the hematoma  · Fever of 100.4ºF (38ºC) or higher, or as directed by your healthcare provider  · If the hematoma is on the arm or leg, watch for:  ¨ Increased swelling or pain in the extremity  ¨ Numbness or tingling or blue color of the hand or foot  Date Last Reviewed: 11/5/2015  © 8812-5653 [email or web address]. [address] [place] [address]. All rights reserved. This information is not intended as a substitute for professional medical care. Always follow your healthcare professional's instructions.

## 2019-03-18 NOTE — PROGRESS NOTES
Subjective:       Patient ID: Amadou Mcneil is a 38 y.o. male.    Chief Complaint: Leg Pain and Abscess    Chief Complaint  Chief Complaint   Patient presents with    Leg Pain    Abscess       HPI  Amadou Mcneil is a 38 y.o. male with medical diagnoses as listed in the medical history and problem list that presents with complaints of left leg pain for 6 weeks following a fall into a hole which caused some bruising, swelling and pain to posterior lower thigh just proximal to knee joint. Presents now with c/o fluid collection and a hard mass that is mobile to posterior lower thigh. Also complains of a right leg abscess to knee which has also been present for five days. Patient had bactrim and bactroban ointment and took the bactrim for 4 days prior to running out of medication on Sunday.  Patient also has a round 1 cm sized pruritic erythematous area to left forearm that he noticed about 1 week ago and states it appears to be resolving.  Also c/o skin breakout to lips that was preceded by some numbness. States does not feel like a cold sore.   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  Blood pressure today is 130/84.  BMI is 31.95 and the patient has gained 7 lb since his last visit on 4/26/2018.  Patient is due for a flu immunization which he declines today.      PAST MEDICAL HISTORY:  History reviewed. No pertinent past medical history.    PAST SURGICAL HISTORY:  Past Surgical History:   Procedure Laterality Date    WISDOM TOOTH EXTRACTION         SOCIAL HISTORY:  Social History     Socioeconomic History    Marital status:      Spouse name: Not on file    Number of children: Not on file    Years of education: Not on file    Highest education level: Not on file   Social Needs    Financial resource strain: Not on file    Food insecurity - worry: Not on file    Food insecurity - inability: Not on file    Transportation needs - medical: Not on file     Transportation needs - non-medical: Not on file   Occupational History    Occupation: offshore worker   Tobacco Use    Smoking status: Former Smoker    Smokeless tobacco: Former User     Types: Chew   Substance and Sexual Activity    Alcohol use: Yes    Drug use: No    Sexual activity: Yes     Partners: Female   Other Topics Concern    Not on file   Social History Narrative    Not on file       FAMILY HISTORY:  Family History   Problem Relation Age of Onset    Cancer Father        ALLERGIES AND MEDICATIONS: updated and reviewed.  Review of patient's allergies indicates:  No Known Allergies  Current Outpatient Medications   Medication Sig Dispense Refill    mupirocin (BACTROBAN) 2 % ointment Apply topically 3 (three) times daily. To affected area 22 g 2    sulfamethoxazole-trimethoprim 800-160mg (BACTRIM DS) 800-160 mg Tab Take 1 tablet by mouth 2 (two) times daily. 20 tablet 0     No current facility-administered medications for this visit.        I have reviewed the patient's medical history in detail and updated the computerized patient record.    Review of Systems   Constitutional: Negative for activity change, appetite change, chills, fatigue and fever.        No regular exercise, active lifestyle.   HENT: Negative for congestion, ear pain, nosebleeds, postnasal drip, rhinorrhea, sinus pain and sore throat.    Eyes: Negative for visual disturbance.   Respiratory: Negative for cough and shortness of breath.    Cardiovascular: Negative for chest pain, palpitations and leg swelling.   Gastrointestinal: Negative for abdominal pain, constipation, diarrhea, nausea and vomiting.   Genitourinary: Negative for difficulty urinating and dysuria.   Musculoskeletal: Positive for joint swelling.        Feels a pocket of fluid or a soft tissue mass to the posterior lower thigh in area of injury from fall.   Skin: Positive for rash and wound.        Rash to left forearm that is itching. Skin abscess to right knee.  "Lips with sores.    Neurological: Negative for dizziness, light-headedness, numbness and headaches.   Hematological: Positive for adenopathy. Does not bruise/bleed easily.        Possible swollen lymph node to right groin that is tender.   Psychiatric/Behavioral: Negative for dysphoric mood and sleep disturbance. The patient is not nervous/anxious.          Objective:      Vitals:    03/18/19 0942   BP: 130/84   BP Location: Right arm   Patient Position: Sitting   BP Method: Large (Automatic)   Pulse: 89   Temp: 98.4 °F (36.9 °C)   TempSrc: Oral   Weight: 109.8 kg (242 lb)   Height: 6' 1" (1.854 m)     Physical Exam   Constitutional: He is oriented to person, place, and time. Vital signs are normal. He appears well-developed. No distress.   Blood pressure 130/84   HENT:   Head: Normocephalic and atraumatic.   Right Ear: Hearing and external ear normal.   Left Ear: Hearing and external ear normal.   Nose: Nose normal.   Upper lip with dry cracked skin noted.    Eyes: Conjunctivae and lids are normal. Pupils are equal, round, and reactive to light. Right eye exhibits no discharge. Left eye exhibits no discharge.   Neck: Normal range of motion. Neck supple. Normal carotid pulses present. Carotid bruit is not present. No neck rigidity.   Cardiovascular: Normal rate, regular rhythm, S1 normal, S2 normal, normal heart sounds, intact distal pulses and normal pulses.   No murmur heard.  Pulses:       Carotid pulses are 2+ on the right side, and 2+ on the left side.       Radial pulses are 2+ on the right side, and 2+ on the left side.        Posterior tibial pulses are 2+ on the right side, and 2+ on the left side.   No edema   Pulmonary/Chest: Effort normal and breath sounds normal. No respiratory distress. He has no decreased breath sounds. He has no wheezes. He has no rhonchi. He has no rales.   Musculoskeletal: Normal range of motion.        Left knee: He exhibits normal range of motion, no swelling, no effusion, no " ecchymosis, no LCL laxity, no bony tenderness and no MCL laxity. No medial joint line, no lateral joint line, no MCL, no LCL and no patellar tendon tenderness noted.   Full range of motion to left knee without pain.   Lymphadenopathy:        Head (right side): No submental, no submandibular and no tonsillar adenopathy present.        Head (left side): No submental, no submandibular and no tonsillar adenopathy present.     He has no cervical adenopathy.        Right: Inguinal adenopathy present. No supraclavicular adenopathy present.        Left: No inguinal and no supraclavicular adenopathy present.   Swollen tender right inguinal lymph node, approximately 1 cm in size   Neurological: He is alert and oriented to person, place, and time. He has normal strength.   Skin: Skin is warm and dry. Lesion noted. No rash noted. He is not diaphoretic. There is erythema.        Psychiatric: He has a normal mood and affect. His speech is normal and behavior is normal. Judgment normal. Cognition and memory are normal.   Nursing note and vitals reviewed.        Assessment:       1. Abscess of right knee    2. Cellulitis of right knee    3. Inguinal lymphadenopathy    4. Chapped lips    5. Fall into hole, initial encounter    6. Leg hematoma, left, initial encounter    7. BMI 31.0-31.9,adult    8. Need for hepatitis A and B vaccination          Plan:       Amadou was seen today for leg pain and abscess.    Diagnoses and all orders for this visit:    Abscess of right knee  -     sulfamethoxazole-trimethoprim 800-160mg (BACTRIM DS) 800-160 mg Tab; Take 1 tablet by mouth 2 (two) times daily.  -     mupirocin (BACTROBAN) 2 % ointment; Apply topically 3 (three) times daily. To affected area  - Educational handouts provided.  Discharge instructions for cellulitis    Cellulitis of right knee  -     sulfamethoxazole-trimethoprim 800-160mg (BACTRIM DS) 800-160 mg Tab; Take 1 tablet by mouth 2 (two) times daily.  -     mupirocin (BACTROBAN) 2  % ointment; Apply topically 3 (three) times daily. To affected area  - Educational handouts provided.  Discharge instructions for cellulitis    Inguinal lymphadenopathy  -     sulfamethoxazole-trimethoprim 800-160mg (BACTRIM DS) 800-160 mg Tab; Take 1 tablet by mouth 2 (two) times daily.    Chapped lips   Apply chap stick to lips throughout day, if symptoms do not resolve, return to office   Valtrex offered for possible herpes simplex 1 infection to lips, refused   Fall into hole, initial encounter   Patient fell about 6 weeks ago with bruising and swelling and pain to left posterior lower thigh   Denies pain to left knee and leg, only concern is with subcutaneous lump to area  Leg hematoma, left, initial encounter  -     US Soft Tissue Misc; Future  - Educational handouts provided. Hematoma    BMI 31.0-31.9,adult   BMI today is 31.95 and the patient has gained 7 lb since his last visit on 4/26/2018   Weight loss recommended with well balanced diet and portion controlled meals and increased activity.   Need for hepatitis A and B vaccination  -     (In Office Administered) Hepatitis A / Hepatitis B Combined Vaccine (IM)      Follow-up if symptoms worsen or fail to improve, return if lymph node enlargement does not resolve.      Patient readiness: acceptance and barriers:readiness    During the course of the visit the patient was educated and counseled about the following:     Obesity:   General weight loss/lifestyle modification strategies discussed (elicit support from others; identify saboteurs; non-food rewards, etc).  Diet interventions: moderate (500 kCal/d) deficit diet.  Informal exercise measures discussed, e.g. taking stairs instead of elevator.  Regular aerobic exercise program discussed.    Goals: Obesity: Reduce calorie intake and BMI    Did patient meet goals/outcomes: No    The following self management tools provided: declined    Patient Instructions (the written plan) was given to the patient/family.      Time spent with patient: 30 minutes    Barriers to medications present (no )    Adverse reactions to current medications (no)    Over the counter medications reviewed (Yes)

## 2019-11-29 ENCOUNTER — OFFICE VISIT (OUTPATIENT)
Dept: FAMILY MEDICINE | Facility: CLINIC | Age: 39
End: 2019-11-29
Payer: COMMERCIAL

## 2019-11-29 VITALS
HEIGHT: 72 IN | WEIGHT: 246.94 LBS | OXYGEN SATURATION: 99 % | SYSTOLIC BLOOD PRESSURE: 110 MMHG | HEART RATE: 67 BPM | DIASTOLIC BLOOD PRESSURE: 70 MMHG | BODY MASS INDEX: 33.45 KG/M2 | TEMPERATURE: 98 F

## 2019-11-29 DIAGNOSIS — M15.9 GENERALIZED OA: Primary | ICD-10-CM

## 2019-11-29 PROCEDURE — 3008F BODY MASS INDEX DOCD: CPT | Mod: CPTII,S$GLB,, | Performed by: FAMILY MEDICINE

## 2019-11-29 PROCEDURE — 3008F PR BODY MASS INDEX (BMI) DOCUMENTED: ICD-10-PCS | Mod: CPTII,S$GLB,, | Performed by: FAMILY MEDICINE

## 2019-11-29 PROCEDURE — 99213 PR OFFICE/OUTPT VISIT, EST, LEVL III, 20-29 MIN: ICD-10-PCS | Mod: S$GLB,,, | Performed by: FAMILY MEDICINE

## 2019-11-29 PROCEDURE — 99999 PR PBB SHADOW E&M-EST. PATIENT-LVL III: CPT | Mod: PBBFAC,,, | Performed by: FAMILY MEDICINE

## 2019-11-29 PROCEDURE — 99213 OFFICE O/P EST LOW 20 MIN: CPT | Mod: S$GLB,,, | Performed by: FAMILY MEDICINE

## 2019-11-29 PROCEDURE — 99999 PR PBB SHADOW E&M-EST. PATIENT-LVL III: ICD-10-PCS | Mod: PBBFAC,,, | Performed by: FAMILY MEDICINE

## 2019-11-29 RX ORDER — NAPROXEN 500 MG/1
500 TABLET ORAL 2 TIMES DAILY
Qty: 60 TABLET | Refills: 2 | Status: SHIPPED | OUTPATIENT
Start: 2019-11-29 | End: 2021-08-27 | Stop reason: ALTCHOICE

## 2019-11-29 NOTE — PATIENT INSTRUCTIONS
Low-Salt Diet  This diet removes foods that are high in salt. It also limits the amount of salt you use when cooking. It is most often used for people with high blood pressure, edema (fluid retention), and kidney, liver, or heart disease.  Table salt contains the mineral sodium. Your body needs sodium to work normally. But too much sodium can make your health problems worse. Your healthcare provider is recommending a low-salt (also called low-sodium) diet for you. Your total daily allowance of salt is 1,500 to 2,300 milligrams (mg). It is less than 1 teaspoon of table salt. This means you can have only about 500 to 700 mg of sodium at each meal. People with certain health problems should limit salt intake to the lower end of the recommended range.    When you cook, dont add much salt. If you can cook without using salt, even better. Dont add salt to your food at the table.  When shopping, read food labels. Salt is often called sodium on the label. Choose foods that are salt-free, low salt, or very low salt. Note that foods with reduced salt may not lower your salt intake enough.    Beans, potatoes, and pasta  Ok: Dry beans, split peas, lentils, potatoes, rice, macaroni, pasta, spaghetti without added salt  Avoid: Potato chips, tortilla chips, and similar products  Breads and cereals  Ok: Low-sodium breads, rolls, cereals, and cakes; low-salt crackers, matzo crackers  Avoid: Salted crackers, pretzels, popcorn, Green[place]ic toast, pancakes, muffins  Dairy  Ok: Milk, chocolate milk, hot chocolate mix, low-salt cheeses, and yogurt  Avoid: Processed cheese and cheese spreads; Roquefort, Camembert, and cottage cheese; buttermilk, instant breakfast drink  Desserts  Ok: Ice cream, frozen yogurt, juice bars, gelatin, cookies and pies, sugar, honey, jelly, hard candy  Avoid: Most pies, cakes and cookies prepared or processed with salt; instant pudding  Drinks  Ok: Tea, coffee, fizzy (carbonated) drinks, juices  Avoid: Flavored  coffees, electrolyte replacement drinks, sports drinks  Meats  Ok: All fresh meat, fish, poultry, low-salt tuna, eggs, egg substitute  Avoid: Smoked, pickled, brine-cured, or salted meats and fish. This includes carter, chipped beef, corned beef, hot dogs, deli meats, ham, kosher meats, salt pork, sausage, canned tuna, salted codfish, smoked salmon, herring, sardines, or anchovies.  Seasonings and spices  Ok: Most seasonings are okay. Good substitutes for salt include: fresh herb blends, hot sauce, lemon, garlic, perez, vinegar, dry mustard, parsley, cilantro, horseradish, tomato paste, regular margarine, mayonnaise, unsalted butter, cream cheese, vegetable oil, cream, low-salt salad dressing and gravy.  Avoid: Regular ketchup, relishes, pickles, soy sauce, teriyaki sauce, Worcestershire sauce, BBQ sauce, tartar sauce, meat tenderizer, chili sauce, regular gravy, regular salad dressing, salted butter  Soups  Ok: Low-salt soups and broths made with allowed foods  Avoid: Bouillon cubes, soups with smoked or salted meats, regular soup and broth  Vegetables  Ok: Most vegetables are okay; also low-salt tomato and vegetable juices  Avoid: Sauerkraut and other brine-soaked vegetables; pickles and other pickled vegetables; tomato juice, olives  Date Last Reviewed: 8/1/2016 © 2000-2017 [employer]. [address] [place] [address]. All rights reserved. This information is not intended as a substitute for professional medical care. Always follow your healthcare professional's instructions.        Nutrition and MyPlate: Grains    Grains (also known as starches) make up foods such as bread, pasta, rice, cereal, and tortillas. Grains provide iron, B vitamins, and other nutrients the body needs to function. And they give your body fiber, which helps your digestion. Fiber also helps you manage your weight, because it's low in calories but fills you up.  Nutrient-rich choices  Whole grains are chock full of  fiber. And they're not processed much, so they keep most of their nutrients. Make at least half the grains you eat whole grains. Good choices include:  · Any bread or breakfast cereal that lists a whole grain (such as whole wheat or whole rolled oats) as the first ingredient. Ingredients are listed from most to least, so if a whole grain is first, you know the food has a lot of it.  · Foods made with whole grains, such as oatmeal, barley soup, wild rice pilaf, and buckwheat (soba) noodles.  What makes grains less healthy?  · As grains are processed (refined), they lose fiber and nutrients. White grains are often refined. This means white bread, white rice, and flour tortillas are not as healthy as whole-grain versions, such as whole-wheat bread, brown rice, or whole-grain tortillas.  · Claims made on food packages can be misleading. Even if a package says the food is a whole-grain product, check the ingredients. Unless a whole grain is listed first, the food isn't as healthy as the package makes it sound. Here's a hint: Wheat bread isn't a whole grain. Whole-wheat bread is. Make sure to read the fine print!  · Added fat and sugar also make grains less healthy. This means cookies, pastries, donuts, snack cakes, sugar cereals you get the idea.  One small change:  Find a whole-grain bread or cereal that you like. Have a better idea? Write it here:  _____________________________________________________________  Date Last Reviewed: 6/4/2015  © 5517-8703 [employer]. [address] [place] [address], [place] [address]. All rights reserved. This information is not intended as a substitute for professional medical care. Always follow your healthcare professional's instructions.

## 2019-11-29 NOTE — PROGRESS NOTES
SUBJECTIVE:   Amadou Mcneil is a 39 y.o. male who complains of acute upper back pain for 4 day(s), positional with bending or lifting, with radiation down the legs. Precipitating factors: none recalled by the patient. Prior history of back problems: recurrent self limited episodes of low back pain in the past. There is no numbness in the legs and arms.    OBJECTIVE:  /70 (BP Location: Right arm, Patient Position: Sitting, BP Method: Medium (Manual))   Pulse 67   Temp 98.2 °F (36.8 °C) (Oral)   Ht 6' (1.829 m)   Wt 112 kg (246 lb 14.6 oz)   SpO2 99%   BMI 33.49 kg/m²    Patient appears to be in mild to moderate pain, antalgic gait noted. Lumbosacral spine area reveals no local tenderness or mass.  Painful and reduced LS ROM noted. Upper arm strength intact. Neurological intact. No rashes no edema. Full range of motion shoulder Straight leg raise is negative at  degrees on bilateral. DTR's, motor strength and sensation normal, including heel and toe gait.  Peripheral pulses are palpable. X-Ray: not indicated.    ASSESSMENT:   Neck strain  Generalized OA  -     Sedimentation rate; Future; Expected date: 11/29/2019  -     CBC auto differential; Future; Expected date: 11/29/2019  -     naproxen (NAPROSYN) 500 MG tablet; Take 1 tablet (500 mg total) by mouth 2 (two) times daily.  Dispense: 60 tablet; Refill: 2        PLAN:  For acute pain, rest, intermittent application of heat (do not sleep on heating pad), analgesics and muscle relaxants are recommended. Discussed longer term treatment plan of prn NSAID's and discussed a home back care exercise program with flexion exercise routine. Proper lifting with avoidance of heavy lifting discussed. Consider Physical Therapy and XRay studies if not improving. Call or return to clinic prn if these symptoms worsen or fail to improve as anticipated.

## 2021-08-27 ENCOUNTER — OFFICE VISIT (OUTPATIENT)
Dept: FAMILY MEDICINE | Facility: CLINIC | Age: 41
End: 2021-08-27
Payer: COMMERCIAL

## 2021-08-27 VITALS
HEIGHT: 72 IN | HEART RATE: 72 BPM | SYSTOLIC BLOOD PRESSURE: 110 MMHG | TEMPERATURE: 99 F | DIASTOLIC BLOOD PRESSURE: 74 MMHG | OXYGEN SATURATION: 97 % | BODY MASS INDEX: 34.58 KG/M2 | WEIGHT: 255.31 LBS

## 2021-08-27 DIAGNOSIS — Z00.00 ANNUAL PHYSICAL EXAM: Primary | ICD-10-CM

## 2021-08-27 DIAGNOSIS — E66.9 OBESITY (BMI 30.0-34.9): ICD-10-CM

## 2021-08-27 DIAGNOSIS — R41.840 ATTENTION AND CONCENTRATION DEFICIT: ICD-10-CM

## 2021-08-27 PROCEDURE — 1126F AMNT PAIN NOTED NONE PRSNT: CPT | Mod: CPTII,S$GLB,, | Performed by: NURSE PRACTITIONER

## 2021-08-27 PROCEDURE — 3008F BODY MASS INDEX DOCD: CPT | Mod: CPTII,S$GLB,, | Performed by: NURSE PRACTITIONER

## 2021-08-27 PROCEDURE — 99214 OFFICE O/P EST MOD 30 MIN: CPT | Mod: S$GLB,,, | Performed by: NURSE PRACTITIONER

## 2021-08-27 PROCEDURE — 3008F PR BODY MASS INDEX (BMI) DOCUMENTED: ICD-10-PCS | Mod: CPTII,S$GLB,, | Performed by: NURSE PRACTITIONER

## 2021-08-27 PROCEDURE — 3074F PR MOST RECENT SYSTOLIC BLOOD PRESSURE < 130 MM HG: ICD-10-PCS | Mod: CPTII,S$GLB,, | Performed by: NURSE PRACTITIONER

## 2021-08-27 PROCEDURE — 1160F PR REVIEW ALL MEDS BY PRESCRIBER/CLIN PHARMACIST DOCUMENTED: ICD-10-PCS | Mod: CPTII,S$GLB,, | Performed by: NURSE PRACTITIONER

## 2021-08-27 PROCEDURE — 99999 PR PBB SHADOW E&M-EST. PATIENT-LVL III: ICD-10-PCS | Mod: PBBFAC,,, | Performed by: NURSE PRACTITIONER

## 2021-08-27 PROCEDURE — 3074F SYST BP LT 130 MM HG: CPT | Mod: CPTII,S$GLB,, | Performed by: NURSE PRACTITIONER

## 2021-08-27 PROCEDURE — 1159F PR MEDICATION LIST DOCUMENTED IN MEDICAL RECORD: ICD-10-PCS | Mod: CPTII,S$GLB,, | Performed by: NURSE PRACTITIONER

## 2021-08-27 PROCEDURE — 99999 PR PBB SHADOW E&M-EST. PATIENT-LVL III: CPT | Mod: PBBFAC,,, | Performed by: NURSE PRACTITIONER

## 2021-08-27 PROCEDURE — 1159F MED LIST DOCD IN RCRD: CPT | Mod: CPTII,S$GLB,, | Performed by: NURSE PRACTITIONER

## 2021-08-27 PROCEDURE — 3078F PR MOST RECENT DIASTOLIC BLOOD PRESSURE < 80 MM HG: ICD-10-PCS | Mod: CPTII,S$GLB,, | Performed by: NURSE PRACTITIONER

## 2021-08-27 PROCEDURE — 99214 PR OFFICE/OUTPT VISIT, EST, LEVL IV, 30-39 MIN: ICD-10-PCS | Mod: S$GLB,,, | Performed by: NURSE PRACTITIONER

## 2021-08-27 PROCEDURE — 1160F RVW MEDS BY RX/DR IN RCRD: CPT | Mod: CPTII,S$GLB,, | Performed by: NURSE PRACTITIONER

## 2021-08-27 PROCEDURE — 3078F DIAST BP <80 MM HG: CPT | Mod: CPTII,S$GLB,, | Performed by: NURSE PRACTITIONER

## 2021-08-27 PROCEDURE — 1126F PR PAIN SEVERITY QUANTIFIED, NO PAIN PRESENT: ICD-10-PCS | Mod: CPTII,S$GLB,, | Performed by: NURSE PRACTITIONER

## 2023-02-08 ENCOUNTER — OFFICE VISIT (OUTPATIENT)
Dept: ORTHOPEDICS | Facility: CLINIC | Age: 43
End: 2023-02-08
Payer: COMMERCIAL

## 2023-02-08 ENCOUNTER — HOSPITAL ENCOUNTER (OUTPATIENT)
Dept: RADIOLOGY | Facility: HOSPITAL | Age: 43
Discharge: HOME | End: 2023-02-08
Attending: PHYSICIAN ASSISTANT
Payer: COMMERCIAL

## 2023-02-08 ENCOUNTER — OFFICE VISIT (OUTPATIENT)
Dept: FAMILY MEDICINE | Facility: CLINIC | Age: 43
End: 2023-02-08
Payer: COMMERCIAL

## 2023-02-08 VITALS
HEIGHT: 72 IN | DIASTOLIC BLOOD PRESSURE: 78 MMHG | OXYGEN SATURATION: 96 % | BODY MASS INDEX: 33.51 KG/M2 | HEART RATE: 91 BPM | WEIGHT: 247.38 LBS | TEMPERATURE: 98 F | SYSTOLIC BLOOD PRESSURE: 132 MMHG | RESPIRATION RATE: 16 BRPM

## 2023-02-08 VITALS — WEIGHT: 247.38 LBS | HEIGHT: 72 IN | BODY MASS INDEX: 33.51 KG/M2

## 2023-02-08 DIAGNOSIS — M25.521 RIGHT ELBOW PAIN: Primary | ICD-10-CM

## 2023-02-08 DIAGNOSIS — M77.11 LATERAL EPICONDYLITIS OF RIGHT ELBOW: Primary | ICD-10-CM

## 2023-02-08 DIAGNOSIS — M25.521 RIGHT ELBOW PAIN: ICD-10-CM

## 2023-02-08 PROCEDURE — 1159F PR MEDICATION LIST DOCUMENTED IN MEDICAL RECORD: ICD-10-PCS | Mod: CPTII,S$GLB,, | Performed by: NURSE PRACTITIONER

## 2023-02-08 PROCEDURE — 1159F MED LIST DOCD IN RCRD: CPT | Mod: CPTII,S$GLB,, | Performed by: NURSE PRACTITIONER

## 2023-02-08 PROCEDURE — 3075F SYST BP GE 130 - 139MM HG: CPT | Mod: CPTII,S$GLB,, | Performed by: NURSE PRACTITIONER

## 2023-02-08 PROCEDURE — 73080 XR ELBOW COMPLETE 3 VIEW RIGHT: ICD-10-PCS | Mod: 26,RT,, | Performed by: RADIOLOGY

## 2023-02-08 PROCEDURE — 1160F RVW MEDS BY RX/DR IN RCRD: CPT | Mod: CPTII,S$GLB,, | Performed by: NURSE PRACTITIONER

## 2023-02-08 PROCEDURE — 3008F PR BODY MASS INDEX (BMI) DOCUMENTED: ICD-10-PCS | Mod: CPTII,S$GLB,, | Performed by: PHYSICIAN ASSISTANT

## 2023-02-08 PROCEDURE — 99999 PR PBB SHADOW E&M-EST. PATIENT-LVL III: ICD-10-PCS | Mod: PBBFAC,,, | Performed by: PHYSICIAN ASSISTANT

## 2023-02-08 PROCEDURE — 3008F BODY MASS INDEX DOCD: CPT | Mod: CPTII,S$GLB,, | Performed by: NURSE PRACTITIONER

## 2023-02-08 PROCEDURE — 73080 X-RAY EXAM OF ELBOW: CPT | Mod: 26,RT,, | Performed by: RADIOLOGY

## 2023-02-08 PROCEDURE — 99203 OFFICE O/P NEW LOW 30 MIN: CPT | Mod: S$GLB,,, | Performed by: PHYSICIAN ASSISTANT

## 2023-02-08 PROCEDURE — 99999 PR PBB SHADOW E&M-EST. PATIENT-LVL IV: CPT | Mod: PBBFAC,,, | Performed by: NURSE PRACTITIONER

## 2023-02-08 PROCEDURE — 3078F PR MOST RECENT DIASTOLIC BLOOD PRESSURE < 80 MM HG: ICD-10-PCS | Mod: CPTII,S$GLB,, | Performed by: NURSE PRACTITIONER

## 2023-02-08 PROCEDURE — 99999 PR PBB SHADOW E&M-EST. PATIENT-LVL III: CPT | Mod: PBBFAC,,, | Performed by: PHYSICIAN ASSISTANT

## 2023-02-08 PROCEDURE — 3078F DIAST BP <80 MM HG: CPT | Mod: CPTII,S$GLB,, | Performed by: NURSE PRACTITIONER

## 2023-02-08 PROCEDURE — 96372 PR INJECTION,THERAP/PROPH/DIAG2ST, IM OR SUBCUT: ICD-10-PCS | Mod: S$GLB,,, | Performed by: NURSE PRACTITIONER

## 2023-02-08 PROCEDURE — 96372 THER/PROPH/DIAG INJ SC/IM: CPT | Mod: S$GLB,,, | Performed by: NURSE PRACTITIONER

## 2023-02-08 PROCEDURE — 3008F BODY MASS INDEX DOCD: CPT | Mod: CPTII,S$GLB,, | Performed by: PHYSICIAN ASSISTANT

## 2023-02-08 PROCEDURE — 3008F PR BODY MASS INDEX (BMI) DOCUMENTED: ICD-10-PCS | Mod: CPTII,S$GLB,, | Performed by: NURSE PRACTITIONER

## 2023-02-08 PROCEDURE — 73080 X-RAY EXAM OF ELBOW: CPT | Mod: TC,PN,RT

## 2023-02-08 PROCEDURE — 1159F PR MEDICATION LIST DOCUMENTED IN MEDICAL RECORD: ICD-10-PCS | Mod: CPTII,S$GLB,, | Performed by: PHYSICIAN ASSISTANT

## 2023-02-08 PROCEDURE — 99999 PR PBB SHADOW E&M-EST. PATIENT-LVL IV: ICD-10-PCS | Mod: PBBFAC,,, | Performed by: NURSE PRACTITIONER

## 2023-02-08 PROCEDURE — 1160F PR REVIEW ALL MEDS BY PRESCRIBER/CLIN PHARMACIST DOCUMENTED: ICD-10-PCS | Mod: CPTII,S$GLB,, | Performed by: NURSE PRACTITIONER

## 2023-02-08 PROCEDURE — 99213 OFFICE O/P EST LOW 20 MIN: CPT | Mod: 25,S$GLB,, | Performed by: NURSE PRACTITIONER

## 2023-02-08 PROCEDURE — 3075F PR MOST RECENT SYSTOLIC BLOOD PRESS GE 130-139MM HG: ICD-10-PCS | Mod: CPTII,S$GLB,, | Performed by: NURSE PRACTITIONER

## 2023-02-08 PROCEDURE — 99213 PR OFFICE/OUTPT VISIT, EST, LEVL III, 20-29 MIN: ICD-10-PCS | Mod: 25,S$GLB,, | Performed by: NURSE PRACTITIONER

## 2023-02-08 PROCEDURE — 99203 PR OFFICE/OUTPT VISIT, NEW, LEVL III, 30-44 MIN: ICD-10-PCS | Mod: S$GLB,,, | Performed by: PHYSICIAN ASSISTANT

## 2023-02-08 PROCEDURE — 1159F MED LIST DOCD IN RCRD: CPT | Mod: CPTII,S$GLB,, | Performed by: PHYSICIAN ASSISTANT

## 2023-02-08 RX ORDER — LISDEXAMFETAMINE DIMESYLATE 50 MG/1
50 CAPSULE ORAL EVERY MORNING
COMMUNITY

## 2023-02-08 RX ORDER — KETOROLAC TROMETHAMINE 30 MG/ML
60 INJECTION, SOLUTION INTRAMUSCULAR; INTRAVENOUS
Status: COMPLETED | OUTPATIENT
Start: 2023-02-08 | End: 2023-02-08

## 2023-02-08 RX ORDER — MELOXICAM 15 MG/1
15 TABLET ORAL DAILY
Qty: 30 TABLET | Refills: 2 | Status: SHIPPED | OUTPATIENT
Start: 2023-02-08 | End: 2023-05-16

## 2023-02-08 RX ADMIN — KETOROLAC TROMETHAMINE 60 MG: 30 INJECTION, SOLUTION INTRAMUSCULAR; INTRAVENOUS at 09:02

## 2023-02-08 NOTE — PROGRESS NOTES
[facility]  History & Physical  Orthopedics    SUBJECTIVE:      Chief Complaint: Right elbow    Referring Provider: Mackenzie Barker, *     Dr. Fang is the supervising physician for this encounter/patient    History of Present Illness:  Patient is a 42 y.o. right hand dominant male who presents today with complaints of right elbow pain present for about 6 months, no trauma/injury. Pain to the lateral elbow, worse with lifting and gripping. No radiating pain, no numbness associated with this. He did received an IM steroid injection from his PCM this morning which has been helpful. He has tried an elbow sleeve without much relief as well. No surgical history on the elbows/hands.     The patient is a/an offshore.    Onset of symptoms/DOI was 6 months.    Symptoms are aggravated by activity.    Symptoms are alleviated by rest.    Symptoms consist of pain.    The patient rates their pain as a 6/10.    Attempted treatment(s) and/or interventions include activity modifications, rest,  IM steroid injection, elbow sleeve .     The patient denies any fevers, chills, N/V, D/C and presents for evaluation.       No past medical history on file.  Past Surgical History:   Procedure Laterality Date    WISDOM TOOTH EXTRACTION       Review of patient's allergies indicates:  No Known Allergies  Social History     Social History Narrative    Not on file     Family History   Problem Relation Age of Onset    Cancer Father          Current Outpatient Medications:     lisdexamfetamine (VYVANSE) 50 MG capsule, Take 50 mg by mouth every morning., Disp: , Rfl:     meloxicam (MOBIC) 15 MG tablet, Take 1 tablet (15 mg total) by mouth once daily., Disp: 30 tablet, Rfl: 2  No current facility-administered medications for this visit.      Review of Systems:  Constitutional: no fever or chills  Eyes: no visual changes  ENT: no nasal congestion or sore throat  Respiratory: no cough or shortness of breath  Cardiovascular:  no chest pain  Gastrointestinal: no nausea or vomiting, tolerating diet  Musculoskeletal: pain and soreness    OBJECTIVE:      Vital Signs (Most Recent):  Vitals:    02/08/23 1438   Weight: 112.2 kg (247 lb 5.7 oz)   Height: 6' (1.829 m)     Body mass index is 33.55 kg/m².      Physical Exam:  Constitutional: The patient appears well-developed and well-nourished. No distress.   Skin: No lesions appreciated  Head: Normocephalic and atraumatic.   Nose: Nose normal.   Ears: No deformities seen  Eyes: Conjunctivae and EOM are normal.   Neck: No tracheal deviation present.   Cardiovascular: Normal rate and intact distal pulses.    Pulmonary/Chest: Effort normal. No respiratory distress.   Abdominal: There is no guarding.   Neurological: The patient is alert.   Psychiatric: The patient has a normal mood and affect.     Right Hand/Wrist Examination:    Observation/Inspection:  Swelling  none    Deformity  none  Discoloration  none     Scars   none    Atrophy  none    HAND/WRIST EXAMINATION:  Finkelstein's Test   Neg  WHAT Test    Neg  Snuff box tenderness   Neg  Taylor's Test    Neg  Hook of Hamate Tenderness  Neg  CMC grind    Neg  Circumduction test   Neg  TTP to the lateral epicondyle    Neurovascular Exam:  Digits WWP, brisk CR < 3s throughout  NVI motor/LTS to M/R/U nerves, radial pulse 2+  Tinel's Test - Carpal Tunnel  Neg  Tinel's Test - Cubital Tunnel  Neg  Phalen's Test    Neg  Median Nerve Compression Test Neg    ROM hand full, painless    ROM wrist full, painless. Pain to lateral elbow with resisted wrist extension and resisted wrist supination.    ROM elbow full, pain to lateral elbow with end range supination.    Abdomen not guarded  Respirations nonlabored  Perfusion intact    Diagnostic Results:     Imaging - I independently viewed the patient's imaging as well as the radiology report.  Xrays of the patient's right elbow demonstrate no evidence of any acute fractures or dislocations or significant  degenerative changes.    EMG - none    ASSESSMENT/PLAN:      42 y.o. yo male with Right elbow lateral epicondylitis    Plan: The patient and I had a thorough discussion today.  We discussed the working diagnosis as well as several other potential alternative diagnoses.  Treatment options were discussed, both conservative and surgical.  Conservative treatment options would include things such as activity modifications, workplace modifications, a period of rest, oral vs topical OTC and prescription anti-inflammatory medications, occupational therapy, splinting/bracing, immobilization, corticosteroid injections, and others.  Surgical options were discussed as well.     At this time, the patient would like to proceed with a trial of Mobic 15mg daily starting tomorrow, routine ice/heat, discussed tennis elbow brace for activity. 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 If this treatment fails, he can RTC for tendon CSI as needed (IM steroid injection performed today with PCM).    Should the patient's symptoms worsen, persist, or fail to improve they should return for reevaluation and I would be happy to see them back anytime.           Please do not hesitate to reach out to us via email, phone, or MyChart with any questions, concerns, or feedback.

## 2023-02-08 NOTE — PROGRESS NOTES
Subjective:       Patient ID: Amadou Mcneil is a 42 y.o. male.    Chief Complaint: Other Misc (Elbow pain went form left for a while to right.)    Elbow Injury  This is a chronic problem. The current episode started more than 1 month ago. The problem occurs intermittently. The problem has been unchanged. Pertinent negatives include no chest pain, headaches or rash. The symptoms are aggravated by twisting. He has tried NSAIDs for the symptoms.     History reviewed. No pertinent past medical history.    Review of patient's allergies indicates:  No Known Allergies      Current Outpatient Medications:     lisdexamfetamine (VYVANSE) 50 MG capsule, Take 50 mg by mouth every morning., Disp: , Rfl:     Current Facility-Administered Medications:     ketorolac injection 60 mg, 60 mg, Intramuscular, 1 time in Clinic/HOD, Mackenzie Barker NP    Review of Systems   Constitutional:  Negative for unexpected weight change.   HENT:  Negative for trouble swallowing.    Eyes:  Negative for visual disturbance.   Respiratory:  Negative for shortness of breath.    Cardiovascular:  Negative for chest pain, palpitations and leg swelling.   Gastrointestinal:  Negative for blood in stool.   Genitourinary:  Negative for hematuria.   Skin:  Negative for rash.   Allergic/Immunologic: Negative for immunocompromised state.   Neurological:  Negative for headaches.   Hematological:  Does not bruise/bleed easily.   Psychiatric/Behavioral:  Negative for agitation. The patient is not nervous/anxious.      Objective:      /78 (BP Location: Left arm, Patient Position: Sitting, BP Method: Large (Automatic))   Pulse 91   Temp 98 °F (36.7 °C) (Oral)   Resp 16   Ht 6' (1.829 m)   Wt 112.2 kg (247 lb 5.7 oz)   SpO2 96%   BMI 33.55 kg/m²   Physical Exam  Constitutional:       Appearance: He is well-developed.   Eyes:      Conjunctiva/sclera: Conjunctivae normal.      Pupils: Pupils are equal, round, and reactive to light.    Cardiovascular:      Rate and Rhythm: Normal rate and regular rhythm.      Heart sounds: Normal heart sounds.   Pulmonary:      Effort: Pulmonary effort is normal.   Musculoskeletal:      Right elbow: Decreased range of motion. Tenderness present.        Arms:    Neurological:      Mental Status: He is alert and oriented to person, place, and time.   Psychiatric:         Behavior: Behavior normal.         Thought Content: Thought content normal.         Judgment: Judgment normal.       Assessment:       1. Right elbow pain        Plan:       Right elbow pain  -     ketorolac injection 60 mg  -     Ambulatory referral/consult to Orthopedics; Future; Expected date: 02/15/2023    Apply a compressive ACE bandage. Rest and elevate the affected painful area.  Apply cold compresses intermittently as needed.  As pain recedes, begin normal activities slowly as tolerated.  Call if symptoms persist.   Time spent with patient: 20 minutes    Patient with be reevaluated in 6 weeks or sooner prn    Greater than 50% of this visit was spent counseling as described in above documentation:Yes

## 2023-05-15 DIAGNOSIS — M77.11 LATERAL EPICONDYLITIS OF RIGHT ELBOW: ICD-10-CM

## 2023-05-16 RX ORDER — MELOXICAM 15 MG/1
TABLET ORAL
Qty: 30 TABLET | Refills: 0 | Status: SHIPPED | OUTPATIENT
Start: 2023-05-16 | End: 2023-08-04 | Stop reason: SDUPTHER

## 2023-08-04 DIAGNOSIS — M77.11 LATERAL EPICONDYLITIS OF RIGHT ELBOW: ICD-10-CM

## 2023-08-04 RX ORDER — MELOXICAM 15 MG/1
15 TABLET ORAL DAILY
Qty: 30 TABLET | Refills: 2 | Status: SHIPPED | OUTPATIENT
Start: 2023-08-04 | End: 2023-10-23

## 2023-10-23 DIAGNOSIS — M77.11 LATERAL EPICONDYLITIS OF RIGHT ELBOW: ICD-10-CM

## 2023-10-23 RX ORDER — MELOXICAM 15 MG/1
15 TABLET ORAL
Qty: 30 TABLET | Refills: 0 | Status: SHIPPED | OUTPATIENT
Start: 2023-10-23 | End: 2023-11-22

## 2023-11-22 DIAGNOSIS — M77.11 LATERAL EPICONDYLITIS OF RIGHT ELBOW: ICD-10-CM

## 2023-11-22 RX ORDER — MELOXICAM 15 MG/1
15 TABLET ORAL
Qty: 30 TABLET | Refills: 0 | Status: SHIPPED | OUTPATIENT
Start: 2023-11-22 | End: 2024-01-09 | Stop reason: SDUPTHER

## 2024-01-09 ENCOUNTER — OFFICE VISIT (OUTPATIENT)
Dept: FAMILY MEDICINE | Facility: CLINIC | Age: 44
End: 2024-01-09
Payer: COMMERCIAL

## 2024-01-09 VITALS
OXYGEN SATURATION: 97 % | DIASTOLIC BLOOD PRESSURE: 78 MMHG | BODY MASS INDEX: 37.77 KG/M2 | HEIGHT: 72 IN | RESPIRATION RATE: 18 BRPM | HEART RATE: 77 BPM | WEIGHT: 278.88 LBS | SYSTOLIC BLOOD PRESSURE: 134 MMHG

## 2024-01-09 DIAGNOSIS — Z13.220 SCREENING FOR LIPID DISORDERS: ICD-10-CM

## 2024-01-09 DIAGNOSIS — Z13.29 SCREENING FOR THYROID DISORDER: ICD-10-CM

## 2024-01-09 DIAGNOSIS — M77.11 LATERAL EPICONDYLITIS OF RIGHT ELBOW: ICD-10-CM

## 2024-01-09 DIAGNOSIS — E66.9 OBESITY, CLASS II, BMI 35-39.9: ICD-10-CM

## 2024-01-09 DIAGNOSIS — Z83.3 FAMILY HISTORY OF DIABETES MELLITUS (DM): ICD-10-CM

## 2024-01-09 DIAGNOSIS — Z00.00 ANNUAL PHYSICAL EXAM: Primary | ICD-10-CM

## 2024-01-09 DIAGNOSIS — R73.09 ELEVATED GLUCOSE: ICD-10-CM

## 2024-01-09 PROCEDURE — 3078F DIAST BP <80 MM HG: CPT | Mod: CPTII,S$GLB,, | Performed by: FAMILY MEDICINE

## 2024-01-09 PROCEDURE — 1159F MED LIST DOCD IN RCRD: CPT | Mod: CPTII,S$GLB,, | Performed by: FAMILY MEDICINE

## 2024-01-09 PROCEDURE — 1160F RVW MEDS BY RX/DR IN RCRD: CPT | Mod: CPTII,S$GLB,, | Performed by: FAMILY MEDICINE

## 2024-01-09 PROCEDURE — 3008F BODY MASS INDEX DOCD: CPT | Mod: CPTII,S$GLB,, | Performed by: FAMILY MEDICINE

## 2024-01-09 PROCEDURE — 99396 PREV VISIT EST AGE 40-64: CPT | Mod: S$GLB,,, | Performed by: FAMILY MEDICINE

## 2024-01-09 PROCEDURE — 3075F SYST BP GE 130 - 139MM HG: CPT | Mod: CPTII,S$GLB,, | Performed by: FAMILY MEDICINE

## 2024-01-09 PROCEDURE — 99999 PR PBB SHADOW E&M-EST. PATIENT-LVL IV: CPT | Mod: PBBFAC,,, | Performed by: FAMILY MEDICINE

## 2024-01-09 RX ORDER — MELOXICAM 15 MG/1
15 TABLET ORAL DAILY
Qty: 90 TABLET | Refills: 1 | Status: SHIPPED | OUTPATIENT
Start: 2024-01-09 | End: 2024-07-07

## 2024-01-09 NOTE — PROGRESS NOTES
Subjective:       Patient ID: Amadou Mcneil is a 43 y.o. male.    Chief Complaint: Annual Exam    This patient is new to me.  Amadou Mcneil presents to the clinic today for annual physical exam and lab work. Patient states he is doing well but does have family history of diabetes and wants to be tested for this.   Patient presents with his wife, Deisy, today who reports he has had elevated blood pressure in the past but this goes up and down. Patient states his blood pressure has been high in the past but this was with increased salt in his diet.   Patient educated on plan of care, verbalized understanding.         Review of Systems   Constitutional:  Negative for activity change, appetite change, chills, diaphoresis and fever.   HENT:  Negative for congestion, ear pain, postnasal drip, sinus pressure, sneezing and sore throat.    Eyes:  Negative for pain, discharge, redness and itching.   Respiratory:  Negative for apnea, cough, chest tightness, shortness of breath and wheezing.    Cardiovascular:  Negative for chest pain and leg swelling.   Gastrointestinal:  Negative for abdominal distention, abdominal pain, constipation, diarrhea, nausea and vomiting.   Genitourinary:  Negative for difficulty urinating, dysuria, flank pain and frequency.   Skin:  Negative for color change, rash and wound.   Neurological:  Negative for dizziness.       Patient Active Problem List   Diagnosis    BMI 31.0-31.9,adult       Objective:      Physical Exam  Vitals reviewed.   Constitutional:       General: He is not in acute distress.     Appearance: Normal appearance. He is well-developed.   HENT:      Head: Normocephalic.      Nose: Nose normal.   Eyes:      Conjunctiva/sclera: Conjunctivae normal.      Pupils: Pupils are equal, round, and reactive to light.   Cardiovascular:      Rate and Rhythm: Normal rate and regular rhythm.      Heart sounds: Normal heart sounds.   Pulmonary:      Effort: Pulmonary effort is  "normal. No respiratory distress.      Breath sounds: Normal breath sounds.   Musculoskeletal:      Cervical back: Normal range of motion and neck supple.   Skin:     General: Skin is warm and dry.      Findings: No rash.   Neurological:      Mental Status: He is alert and oriented to person, place, and time.   Psychiatric:         Mood and Affect: Mood normal.         Behavior: Behavior normal.         No results found for: "WBC", "HGB", "HCT", "PLT", "CHOL", "TRIG", "HDL", "LDLDIRECT", "ALT", "AST", "NA", "K", "CL", "CREATININE", "BUN", "CO2", "TSH", "PSA", "INR", "GLUF", "HGBA1C", "MICROALBUR"  The ASCVD Risk score (Romain BAKER, et al., 2019) failed to calculate for the following reasons:    Cannot find a previous HDL lab    Cannot find a previous total cholesterol lab  Visit Vitals  /78 (BP Location: Right arm, Patient Position: Sitting, BP Method: Large (Manual))   Pulse 77   Resp 18   Ht 6' (1.829 m)   Wt 126.5 kg (278 lb 14.1 oz)   SpO2 97%   BMI 37.82 kg/m²      Assessment:       1. Family history of diabetes mellitus (DM)    2. Lateral epicondylitis of right elbow    3. Screening for thyroid disorder    4. Elevated glucose    5. Screening for lipid disorders    6. Obesity, Class II, BMI 35-39.9    7. Annual physical exam        Plan:       Amadou was seen today for annual exam.    Diagnoses and all orders for this visit:    Annual physical exam / Obesity, Class II, BMI 35-39.9  -     T4, Free; Future  -     TSH; Future  -     Hemoglobin A1C; Future  -     Lipid Panel; Future  -     Comprehensive Metabolic Panel; Future  -     CBC Auto Differential; Future  -     CBC Auto Differential; Future  Body mass index is 37.82 kg/m².  Continue healthy diet and regular exercise as tolerated.  Continue medications as prescribed.  Follow up with PCP     Family history of diabetes mellitus (DM) / Elevated glucose  -     Hemoglobin A1C; Future  -     Comprehensive Metabolic Panel; Future    Lateral epicondylitis of right " elbow  -     meloxicam (MOBIC) 15 MG tablet; Take 1 tablet (15 mg total) by mouth once daily.    Screening for thyroid disorder  -     T4, Free; Future  -     TSH; Future    Screening for lipid disorders  -     Lipid Panel; Future           Follow up if symptoms worsen or fail to improve.      Future Appointments       Date Specialty Appt Notes    1/16/2024 Lab fasting labs             All of your core healthy metrics are met.

## 2024-01-16 ENCOUNTER — TELEPHONE (OUTPATIENT)
Dept: FAMILY MEDICINE | Facility: CLINIC | Age: 44
End: 2024-01-16
Payer: COMMERCIAL

## 2024-01-16 DIAGNOSIS — R53.83 FATIGUE, UNSPECIFIED TYPE: Primary | ICD-10-CM

## 2024-01-16 NOTE — TELEPHONE ENCOUNTER
----- Message from Diamone Speed sent at 1/16/2024  1:49 PM CST -----  Regarding: nae palmer  Type: Patient Call Back       Who called:nae palmer        What is the request in detail: pt wants to know if he can get his testosterone and vitamins d he is jillian to the labs [facility] and wants to know if he can get it all do there .       Can the clinic reply by [facility]? Yes       Would the patient rather a call back or a response via [facility]? Call back       Best call back number:[phone]       Additional Information:

## 2024-01-17 ENCOUNTER — LAB VISIT (OUTPATIENT)
Dept: LAB | Facility: HOSPITAL | Age: 44
End: 2024-01-17
Payer: COMMERCIAL

## 2024-01-17 DIAGNOSIS — Z83.3 FAMILY HISTORY OF DIABETES MELLITUS (DM): ICD-10-CM

## 2024-01-17 DIAGNOSIS — Z13.220 SCREENING FOR LIPID DISORDERS: ICD-10-CM

## 2024-01-17 DIAGNOSIS — E78.1 PURE HYPERTRIGLYCERIDEMIA: ICD-10-CM

## 2024-01-17 DIAGNOSIS — Z13.29 SCREENING FOR THYROID DISORDER: ICD-10-CM

## 2024-01-17 DIAGNOSIS — R53.83 FATIGUE, UNSPECIFIED TYPE: ICD-10-CM

## 2024-01-17 DIAGNOSIS — R73.09 ELEVATED GLUCOSE: ICD-10-CM

## 2024-01-17 DIAGNOSIS — Z00.00 ANNUAL PHYSICAL EXAM: ICD-10-CM

## 2024-01-17 DIAGNOSIS — E66.9 OBESITY, CLASS II, BMI 35-39.9: ICD-10-CM

## 2024-01-17 DIAGNOSIS — R74.8 ELEVATED LIVER ENZYMES: Primary | ICD-10-CM

## 2024-01-17 LAB
ALBUMIN SERPL BCP-MCNC: 3.9 G/DL (ref 3.5–5.2)
ALP SERPL-CCNC: 77 U/L (ref 55–135)
ALT SERPL W/O P-5'-P-CCNC: 63 U/L (ref 10–44)
ANION GAP SERPL CALC-SCNC: 7 MMOL/L (ref 8–16)
AST SERPL-CCNC: 31 U/L (ref 10–40)
BASOPHILS # BLD AUTO: 0.06 K/UL (ref 0–0.2)
BASOPHILS NFR BLD: 1.1 % (ref 0–1.9)
BILIRUB SERPL-MCNC: 0.3 MG/DL (ref 0.1–1)
BUN SERPL-MCNC: 12 MG/DL (ref 6–20)
CALCIUM SERPL-MCNC: 9 MG/DL (ref 8.7–10.5)
CHLORIDE SERPL-SCNC: 109 MMOL/L (ref 95–110)
CHOLEST SERPL-MCNC: 259 MG/DL (ref 120–199)
CHOLEST/HDLC SERPL: 5.4 {RATIO} (ref 2–5)
CO2 SERPL-SCNC: 24 MMOL/L (ref 23–29)
CREAT SERPL-MCNC: 1.2 MG/DL (ref 0.5–1.4)
DIFFERENTIAL METHOD BLD: NORMAL
EOSINOPHIL # BLD AUTO: 0.4 K/UL (ref 0–0.5)
EOSINOPHIL NFR BLD: 6.2 % (ref 0–8)
ERYTHROCYTE [DISTWIDTH] IN BLOOD BY AUTOMATED COUNT: 13.8 % (ref 11.5–14.5)
EST. GFR  (NO RACE VARIABLE): >60 ML/MIN/1.73 M^2
ESTIMATED AVG GLUCOSE: 91 MG/DL (ref 68–131)
GLUCOSE SERPL-MCNC: 105 MG/DL (ref 70–110)
HBA1C MFR BLD: 4.8 % (ref 4–5.6)
HCT VFR BLD AUTO: 48.9 % (ref 40–54)
HDLC SERPL-MCNC: 48 MG/DL (ref 40–75)
HDLC SERPL: 18.5 % (ref 20–50)
HGB BLD-MCNC: 16.6 G/DL (ref 14–18)
IMM GRANULOCYTES # BLD AUTO: 0.03 K/UL (ref 0–0.04)
IMM GRANULOCYTES NFR BLD AUTO: 0.5 % (ref 0–0.5)
LDLC SERPL CALC-MCNC: ABNORMAL MG/DL (ref 63–159)
LYMPHOCYTES # BLD AUTO: 1.3 K/UL (ref 1–4.8)
LYMPHOCYTES NFR BLD: 23.7 % (ref 18–48)
MCH RBC QN AUTO: 30.8 PG (ref 27–31)
MCHC RBC AUTO-ENTMCNC: 33.9 G/DL (ref 32–36)
MCV RBC AUTO: 91 FL (ref 82–98)
MONOCYTES # BLD AUTO: 0.8 K/UL (ref 0.3–1)
MONOCYTES NFR BLD: 13.7 % (ref 4–15)
NEUTROPHILS # BLD AUTO: 3.1 K/UL (ref 1.8–7.7)
NEUTROPHILS NFR BLD: 54.8 % (ref 38–73)
NONHDLC SERPL-MCNC: 211 MG/DL
NRBC BLD-RTO: 0 /100 WBC
PLATELET # BLD AUTO: 213 K/UL (ref 150–450)
PMV BLD AUTO: 11.3 FL (ref 9.2–12.9)
POTASSIUM SERPL-SCNC: 4.3 MMOL/L (ref 3.5–5.1)
PROT SERPL-MCNC: 7.1 G/DL (ref 6–8.4)
RBC # BLD AUTO: 5.39 M/UL (ref 4.6–6.2)
SODIUM SERPL-SCNC: 140 MMOL/L (ref 136–145)
T4 FREE SERPL-MCNC: 0.87 NG/DL (ref 0.71–1.51)
TRIGL SERPL-MCNC: 532 MG/DL (ref 30–150)
TSH SERPL DL<=0.005 MIU/L-ACNC: 3.15 UIU/ML (ref 0.4–4)
WBC # BLD AUTO: 5.61 K/UL (ref 3.9–12.7)

## 2024-01-17 PROCEDURE — 83036 HEMOGLOBIN GLYCOSYLATED A1C: CPT | Performed by: FAMILY MEDICINE

## 2024-01-17 PROCEDURE — 84439 ASSAY OF FREE THYROXINE: CPT | Performed by: FAMILY MEDICINE

## 2024-01-17 PROCEDURE — 84443 ASSAY THYROID STIM HORMONE: CPT | Performed by: FAMILY MEDICINE

## 2024-01-17 PROCEDURE — 80061 LIPID PANEL: CPT | Performed by: FAMILY MEDICINE

## 2024-01-17 PROCEDURE — 36415 COLL VENOUS BLD VENIPUNCTURE: CPT | Mod: PO | Performed by: FAMILY MEDICINE

## 2024-01-17 PROCEDURE — 80053 COMPREHEN METABOLIC PANEL: CPT | Performed by: FAMILY MEDICINE

## 2024-01-17 PROCEDURE — 85025 COMPLETE CBC W/AUTO DIFF WBC: CPT | Performed by: FAMILY MEDICINE

## 2024-01-17 RX ORDER — FENOFIBRATE 134 MG/1
134 CAPSULE ORAL
Qty: 90 CAPSULE | Refills: 3 | Status: SHIPPED | OUTPATIENT
Start: 2024-01-17 | End: 2025-01-16

## 2024-01-17 NOTE — TELEPHONE ENCOUNTER
He came at 7 am prior to me placing orders so he will have to come back for Vitamin D and Testosterone if he still wants them.

## 2024-02-23 ENCOUNTER — TELEPHONE (OUTPATIENT)
Dept: FAMILY MEDICINE | Facility: CLINIC | Age: 44
End: 2024-02-23

## 2024-02-23 DIAGNOSIS — R79.89 LOW VITAMIN D LEVEL: ICD-10-CM

## 2024-02-23 DIAGNOSIS — R79.89 LOW TESTOSTERONE IN MALE: ICD-10-CM

## 2024-02-23 DIAGNOSIS — E78.1 PURE HYPERTRIGLYCERIDEMIA: Primary | ICD-10-CM

## 2024-02-23 NOTE — TELEPHONE ENCOUNTER
Spoke to pt wife who states pt has changed diet and exercise and is not wanting to start medication for his elevated Triglycerides. Pt is wanting to know if this can be rechecked since he has made this change for a few weeks now to see if there has been any improvements. Pt also states his vitamin D was very low last times and received pellets. Pt wanting to know if that as well as Testosterone can be reviewed along with his triglycerides.

## 2024-02-23 NOTE — TELEPHONE ENCOUNTER
Cholesterol would not need to be checked for 6 months but I will put orders in for 6 month re-check

## 2024-02-23 NOTE — TELEPHONE ENCOUNTER
----- Message from Linette Rosenberg sent at 2/23/2024  9:52 AM CST -----  Regarding: labs  Contact: pt's wife, spencer  Type:  Needs Medical Advice    Who Called: pt's wifespencer    Would the patient rather a call back or a response via [facility]? Call back    Best Call Back Number: [phone] for spencer    Additional Information: pt is asking for blood work to check his triglycerides.  Wondering if he needs to take an RX for it.   Please advise.  Thank you.

## 2024-04-04 ENCOUNTER — HOSPITAL ENCOUNTER (EMERGENCY)
Facility: HOSPITAL | Age: 44
Discharge: HOME | End: 2024-04-04
Attending: EMERGENCY MEDICINE
Payer: COMMERCIAL

## 2024-04-04 VITALS
OXYGEN SATURATION: 98 % | BODY MASS INDEX: 35.21 KG/M2 | RESPIRATION RATE: 16 BRPM | HEART RATE: 96 BPM | DIASTOLIC BLOOD PRESSURE: 110 MMHG | SYSTOLIC BLOOD PRESSURE: 163 MMHG | HEIGHT: 72 IN | WEIGHT: 260 LBS | TEMPERATURE: 99 F

## 2024-04-04 DIAGNOSIS — S46.219A BICEPS MUSCLE TEAR: ICD-10-CM

## 2024-04-04 DIAGNOSIS — S46.211A BICEPS TENDON RUPTURE, RIGHT, INITIAL ENCOUNTER: Primary | ICD-10-CM

## 2024-04-04 PROCEDURE — 99283 EMERGENCY DEPT VISIT LOW MDM: CPT | Mod: 25

## 2024-04-04 NOTE — PLAN OF CARE
Pt scheduled to see Dr. Corbin Hager MD April 11, 2024 @11:00 am [address] [place] [address]

## 2024-04-04 NOTE — ED PROVIDER NOTES
Encounter Date: 4/4/2024       History     Chief Complaint   Patient presents with    Arm Injury     Possible torn right bicep muscle while trying to  a heavy object     43-year-old male presented emergency department with right arm pain and deformity at the biceps.  Patient was picking up a heavy weight and felt a pop in the right biceps area and felt the biceps muscle detached.  Patient denies any trauma.  Denies fever chills or nausea vomiting.  Denies any injuries and denies any bony pain.  Patient can flex his elbow and biceps however biceps is extremely deformed when he flexes the biceps and clearly appears to be ruptured      Review of patient's allergies indicates:  No Known Allergies  No past medical history on file.  Past Surgical History:   Procedure Laterality Date    WISDOM TOOTH EXTRACTION       Family History   Problem Relation Age of Onset    Cancer Father      Social History     Tobacco Use    Smoking status: Former    Smokeless tobacco: Former     Types: Chew   Substance Use Topics    Alcohol use: Yes    Drug use: No     Review of Systems   Constitutional: Negative.    HENT: Negative.     Eyes: Negative.    Respiratory: Negative.     Cardiovascular: Negative.    Gastrointestinal: Negative.    Endocrine: Negative.    Genitourinary: Negative.    Musculoskeletal:  Positive for myalgias.   Skin: Negative.    Allergic/Immunologic: Negative.    Neurological: Negative.    Hematological: Negative.    Psychiatric/Behavioral: Negative.     All other systems reviewed and are negative.      Physical Exam     Initial Vitals [04/04/24 1304]   BP Pulse Resp Temp SpO2   (!) 163/110 96 16 98.6 °F (37 °C) 98 %      MAP       --         Physical Exam    Nursing note and vitals reviewed.  Constitutional: He appears well-developed and well-nourished.   HENT:   Head: Normocephalic and atraumatic.   Nose: Nose normal.   Eyes: Conjunctivae and EOM are normal.   Neck: Neck supple. No tracheal deviation present.    Normal range of motion.  Cardiovascular:  Normal rate, regular rhythm, normal heart sounds and intact distal pulses.     Exam reveals no friction rub.       No murmur heard.  Pulmonary/Chest: Breath sounds normal. No respiratory distress. He has no wheezes. He has no rales.   Abdominal: Abdomen is soft. He exhibits no distension. There is no abdominal tenderness.   Musculoskeletal:         General: Tenderness present. Normal range of motion.      Cervical back: Normal range of motion and neck supple.      Comments: Mild tenderness of distal biceps region and with flexion has prominent biceps muscle consistent with biceps tendon rupture.  Right upper extremities neurovascularly intact     Neurological: He is alert and oriented to person, place, and time. He has normal strength. No sensory deficit. GCS score is 15. GCS eye subscore is 4. GCS verbal subscore is 5. GCS motor subscore is 6.   Skin: Skin is warm and dry. Capillary refill takes less than 2 seconds.   Psychiatric: He has a normal mood and affect. Thought content normal.         ED Course   Procedures  Labs Reviewed - No data to display       Imaging Results              X-Ray Humerus 2 View Right (Final result)  Result time 04/04/24 13:40:59      Final result by Daren Doan MD (04/04/24 13:40:59)                   Narrative:    Right humerus 2 views    CLINICAL DATA: Lifting injury    FINDINGS: AP and lateral images demonstrate no fracture or dislocation. No osseous destructive lesion is identified.    No significant arthritic change is demonstrated at the shoulder or elbow. There may be some degree of foreshortening of the biceps muscle.    IMPRESSION:  1. No osseous abnormalities.  2. There is suggestion of foreshortening of the biceps muscle. Correlate with physical exam findings for exclusion of biceps tendon rupture.    Electronically signed by:  Daren Doan MD  04/04/2024 01:40 PM CDT Workstation: [id number]                                   X-Rays:   Independently Interpreted Readings:   Other Readings:  Right humerus x-ray shows evidence of biceps tendon rupture    Medications - No data to display  Medical Decision Making  Patient with right biceps tendon rupture on clinical exam.  X-ray does show evidence of that.  No bony injuries noted.  Extremities neurovascularly intact.  Case discussed with Dr. Camacho orthopedic surgeon on-call and he recommended follow-up with orthopedics next week and patient would need surgery.  Shoulder immobilizer placed on the right and patient did have improvement of symptoms and patient declined pain medication.  As feeling well discharged with return precautions and instructions and follow-up    Amount and/or Complexity of Data Reviewed  Radiology: ordered. Decision-making details documented in ED Course.                                      Clinical Impression:  Final diagnoses:  [S46.211A] Biceps tendon rupture, right, initial encounter (Primary)          ED Disposition Condition    Discharge Fair          ED Prescriptions    None       Follow-up Information       Follow up With Specialties Details Why Contact Info    Nel Alanis MD Family Medicine In 2 days  [address]  [place] [address]  [phone]      Giovanni Camacho MD Orthopedic Surgery In 2 days  [address] [place] [address] Drive  [place] [address]  [phone]      Corbin Hager MD Orthopedic Surgery, Surgery, Sports Medicine Go in 1 week(s) Appt. time 11:00 am; Bring discharge paperwork [address]  [place] [address]  [phone]      Ajit Mcknight MD Sports Medicine, Orthopedic Surgery In 2 days  [address] [place] [address] [address]  [place] [address]  [phone]               July Severino MD  04/04/24 [id number]

## 2024-04-04 NOTE — DISCHARGE INSTRUCTIONS
Support right arm in sling.  Rest.  Ice.  Elevation.  Return to emergency department for worsening symptoms or any problems.  You must follow-up with orthopedic surgeon within a week and would need surgery

## 2024-07-03 DIAGNOSIS — M77.11 LATERAL EPICONDYLITIS OF RIGHT ELBOW: ICD-10-CM

## 2024-07-03 RX ORDER — MELOXICAM 15 MG/1
15 TABLET ORAL
Qty: 90 TABLET | Refills: 0 | Status: SHIPPED | OUTPATIENT
Start: 2024-07-03